# Patient Record
Sex: MALE | Race: WHITE | NOT HISPANIC OR LATINO | Employment: OTHER | URBAN - METROPOLITAN AREA
[De-identification: names, ages, dates, MRNs, and addresses within clinical notes are randomized per-mention and may not be internally consistent; named-entity substitution may affect disease eponyms.]

---

## 2023-02-14 PROBLEM — K63.5 COLONIC POLYP: Status: ACTIVE | Noted: 2023-02-14

## 2023-02-14 PROBLEM — K21.9 GERD (GASTROESOPHAGEAL REFLUX DISEASE): Status: ACTIVE | Noted: 2023-02-14

## 2023-02-14 PROBLEM — Z86.0100 HISTORY OF COLON POLYPS: Status: ACTIVE | Noted: 2023-02-14

## 2023-02-14 PROBLEM — I10 ESSENTIAL HYPERTENSION: Status: ACTIVE | Noted: 2023-02-14

## 2023-02-14 PROBLEM — K76.0 FATTY INFILTRATION OF LIVER: Status: ACTIVE | Noted: 2023-02-14

## 2023-02-14 PROBLEM — R73.01 ABNORMAL FASTING GLUCOSE: Status: ACTIVE | Noted: 2023-02-14

## 2023-02-14 PROBLEM — R40.0 DAYTIME SLEEPINESS: Status: ACTIVE | Noted: 2023-02-14

## 2023-02-14 PROBLEM — Z86.010 HISTORY OF COLON POLYPS: Status: ACTIVE | Noted: 2023-02-14

## 2023-02-14 PROBLEM — I50.32 CHRONIC DIASTOLIC CONGESTIVE HEART FAILURE (MULTI): Status: ACTIVE | Noted: 2023-02-14

## 2023-02-14 PROBLEM — E78.5 HYPERLIPIDEMIA: Status: ACTIVE | Noted: 2023-02-14

## 2023-02-14 PROBLEM — J01.00 ACUTE MAXILLARY SINUSITIS: Status: ACTIVE | Noted: 2023-02-14

## 2023-02-14 PROBLEM — R29.818 SUSPECTED SLEEP APNEA: Status: ACTIVE | Noted: 2023-02-14

## 2023-02-14 PROBLEM — R79.89 ELEVATED LFTS: Status: ACTIVE | Noted: 2023-02-14

## 2023-02-14 PROBLEM — D69.6 THROMBOCYTOPENIA (CMS-HCC): Status: ACTIVE | Noted: 2023-02-14

## 2023-02-14 RX ORDER — GINSENG 100 MG
CAPSULE ORAL
COMMUNITY

## 2023-02-14 RX ORDER — VALSARTAN AND HYDROCHLOROTHIAZIDE 320; 12.5 MG/1; MG/1
1 TABLET, FILM COATED ORAL DAILY
COMMUNITY
Start: 2019-05-17 | End: 2023-03-27 | Stop reason: SDUPTHER

## 2023-02-14 RX ORDER — MULTIVIT-MIN/IRON/FOLIC ACID/K 18-600-40
CAPSULE ORAL
COMMUNITY

## 2023-03-22 LAB
ALANINE AMINOTRANSFERASE (SGPT) (U/L) IN SER/PLAS: 48 U/L (ref 10–52)
ALBUMIN (G/DL) IN SER/PLAS: 4.1 G/DL (ref 3.4–5)
ALKALINE PHOSPHATASE (U/L) IN SER/PLAS: 94 U/L (ref 33–136)
ANION GAP IN SER/PLAS: 12 MMOL/L (ref 10–20)
ASPARTATE AMINOTRANSFERASE (SGOT) (U/L) IN SER/PLAS: 76 U/L (ref 9–39)
BILIRUBIN TOTAL (MG/DL) IN SER/PLAS: 1.4 MG/DL (ref 0–1.2)
CALCIUM (MG/DL) IN SER/PLAS: 9.3 MG/DL (ref 8.6–10.3)
CARBON DIOXIDE, TOTAL (MMOL/L) IN SER/PLAS: 29 MMOL/L (ref 21–32)
CHLORIDE (MMOL/L) IN SER/PLAS: 99 MMOL/L (ref 98–107)
CHOLESTEROL (MG/DL) IN SER/PLAS: 266 MG/DL (ref 0–199)
CHOLESTEROL IN HDL (MG/DL) IN SER/PLAS: 49 MG/DL
CHOLESTEROL/HDL RATIO: 5.4
CREATININE (MG/DL) IN SER/PLAS: 0.87 MG/DL (ref 0.5–1.3)
GFR MALE: >90 ML/MIN/1.73M2
GLUCOSE (MG/DL) IN SER/PLAS: 121 MG/DL (ref 74–99)
LDL: 167 MG/DL (ref 0–99)
NON HDL CHOLESTEROL: 217 MG/DL
POTASSIUM (MMOL/L) IN SER/PLAS: 4 MMOL/L (ref 3.5–5.3)
PROTEIN TOTAL: 7.8 G/DL (ref 6.4–8.2)
SODIUM (MMOL/L) IN SER/PLAS: 136 MMOL/L (ref 136–145)
TRIGLYCERIDE (MG/DL) IN SER/PLAS: 251 MG/DL (ref 0–149)
UREA NITROGEN (MG/DL) IN SER/PLAS: 13 MG/DL (ref 6–23)
VLDL: 50 MG/DL (ref 0–40)

## 2023-03-27 ENCOUNTER — OFFICE VISIT (OUTPATIENT)
Dept: PRIMARY CARE | Facility: CLINIC | Age: 63
End: 2023-03-27
Payer: COMMERCIAL

## 2023-03-27 VITALS
WEIGHT: 239 LBS | BODY MASS INDEX: 32.37 KG/M2 | HEIGHT: 72 IN | HEART RATE: 79 BPM | SYSTOLIC BLOOD PRESSURE: 140 MMHG | DIASTOLIC BLOOD PRESSURE: 82 MMHG | OXYGEN SATURATION: 98 % | RESPIRATION RATE: 16 BRPM

## 2023-03-27 DIAGNOSIS — I50.32 CHRONIC DIASTOLIC CONGESTIVE HEART FAILURE (MULTI): ICD-10-CM

## 2023-03-27 DIAGNOSIS — R79.89 ELEVATED LFTS: ICD-10-CM

## 2023-03-27 DIAGNOSIS — D69.6 LOW PLATELET COUNT (CMS-HCC): ICD-10-CM

## 2023-03-27 DIAGNOSIS — Z78.9 STATIN INTOLERANCE: ICD-10-CM

## 2023-03-27 DIAGNOSIS — K76.0 FATTY INFILTRATION OF LIVER: ICD-10-CM

## 2023-03-27 DIAGNOSIS — I10 ESSENTIAL HYPERTENSION: Primary | Chronic | ICD-10-CM

## 2023-03-27 PROCEDURE — 3077F SYST BP >= 140 MM HG: CPT | Performed by: STUDENT IN AN ORGANIZED HEALTH CARE EDUCATION/TRAINING PROGRAM

## 2023-03-27 PROCEDURE — G0446 INTENS BEHAVE THER CARDIO DX: HCPCS | Performed by: STUDENT IN AN ORGANIZED HEALTH CARE EDUCATION/TRAINING PROGRAM

## 2023-03-27 PROCEDURE — 1036F TOBACCO NON-USER: CPT | Performed by: STUDENT IN AN ORGANIZED HEALTH CARE EDUCATION/TRAINING PROGRAM

## 2023-03-27 PROCEDURE — 99214 OFFICE O/P EST MOD 30 MIN: CPT | Performed by: STUDENT IN AN ORGANIZED HEALTH CARE EDUCATION/TRAINING PROGRAM

## 2023-03-27 PROCEDURE — 3079F DIAST BP 80-89 MM HG: CPT | Performed by: STUDENT IN AN ORGANIZED HEALTH CARE EDUCATION/TRAINING PROGRAM

## 2023-03-27 RX ORDER — VALSARTAN AND HYDROCHLOROTHIAZIDE 320; 12.5 MG/1; MG/1
1 TABLET, FILM COATED ORAL DAILY
Qty: 90 TABLET | Refills: 0 | Status: SHIPPED | OUTPATIENT
Start: 2023-03-27 | End: 2023-04-24 | Stop reason: SDUPTHER

## 2023-03-27 ASSESSMENT — PATIENT HEALTH QUESTIONNAIRE - PHQ9
SUM OF ALL RESPONSES TO PHQ9 QUESTIONS 1 AND 2: 0
1. LITTLE INTEREST OR PLEASURE IN DOING THINGS: NOT AT ALL
2. FEELING DOWN, DEPRESSED OR HOPELESS: NOT AT ALL

## 2023-03-27 ASSESSMENT — ENCOUNTER SYMPTOMS
BRUISES/BLEEDS EASILY: 0
BLOOD IN STOOL: 0
UNEXPECTED WEIGHT CHANGE: 1
PALPITATIONS: 0
COUGH: 0
ACTIVITY CHANGE: 0
HEMATURIA: 0
ADENOPATHY: 0
OCCASIONAL FEELINGS OF UNSTEADINESS: 0
ABDOMINAL PAIN: 0
FATIGUE: 0
DECREASED CONCENTRATION: 0
CONFUSION: 0
SHORTNESS OF BREATH: 0
LOSS OF SENSATION IN FEET: 0
DEPRESSION: 0

## 2023-03-27 ASSESSMENT — COLUMBIA-SUICIDE SEVERITY RATING SCALE - C-SSRS
6. HAVE YOU EVER DONE ANYTHING, STARTED TO DO ANYTHING, OR PREPARED TO DO ANYTHING TO END YOUR LIFE?: NO
1. IN THE PAST MONTH, HAVE YOU WISHED YOU WERE DEAD OR WISHED YOU COULD GO TO SLEEP AND NOT WAKE UP?: NO
2. HAVE YOU ACTUALLY HAD ANY THOUGHTS OF KILLING YOURSELF?: NO

## 2023-03-27 NOTE — PROGRESS NOTES
Patient Name:  Dalton Valverde  MRN:  [id number]  :  1960    Subjective   Patient ID: Dalton Valverde is a 62 y.o. male who presents for Transfer Of Care.    HPI     61 yo male here for lab review   He denies any concerns today    Review of Systems   Constitutional:  Positive for unexpected weight change. Negative for activity change and fatigue.   Eyes:  Negative for visual disturbance.   Respiratory:  Negative for cough and shortness of breath.    Cardiovascular:  Negative for chest pain, palpitations and leg swelling.   Gastrointestinal:  Negative for abdominal pain and blood in stool.   Genitourinary:  Negative for hematuria.   Allergic/Immunologic: Negative for immunocompromised state.   Hematological:  Negative for adenopathy. Does not bruise/bleed easily.   Psychiatric/Behavioral:  Negative for confusion and decreased concentration.        Objective   /82   Pulse 79   Resp 16   Ht 1.829 m (6')   Wt 108 kg (239 lb)   SpO2 98%   BMI 32.41 kg/m²     Physical Exam  Constitutional:       General: He is not in acute distress.     Appearance: Normal appearance. He is not ill-appearing.   HENT:      Head: Normocephalic and atraumatic.      Right Ear: Hearing normal.      Left Ear: Hearing normal.      Mouth/Throat:      Pharynx: No oropharyngeal exudate or posterior oropharyngeal erythema.   Eyes:      Extraocular Movements: Extraocular movements intact.   Cardiovascular:      Rate and Rhythm: Normal rate and regular rhythm.   Pulmonary:      Effort: Pulmonary effort is normal. No respiratory distress.   Abdominal:      Tenderness: There is no abdominal tenderness.   Musculoskeletal:      Cervical back: No tenderness.      Right lower leg: No edema.      Left lower leg: No edema.   Skin:     General: Skin is warm and dry.   Neurological:      General: No focal deficit present.      Mental Status: He is alert and oriented to person, place, and time.   Psychiatric:         Mood and Affect: Mood normal.          Behavior: Behavior normal.       Assessment/Plan   Problem List Items Addressed This Visit          Circulatory    Chronic diastolic congestive heart failure (CMS/HCC) (Chronic)     Echo 2017-normal LV size and function with an ejection fraction of 65%, stage II diastolic dysfunction, mild left ventricular hypertrophy, normal RV size and function, and no significant valvular abnormalities.   Not fluid overloaded- not requiring lasix  BP managed with valsartan-hydrochlorothiazide 320-12.5   No BB          Relevant Orders    Follow Up In [facility] - PCP    Essential hypertension - Primary (Chronic)     Goal >130/80, not at goal today, patient on edge after platelet discussion, will hold on med change, if next visit elevation will adjust regimen  Continue valsartan-hydrochlorothiazide 320-12.5  Complete BMP  Work on consistent activity and monitoring sodium intake          Relevant Medications    valsartan-hydrochlorothiazide (Diovan-HCT) 320-12.5 mg tablet    Other Relevant Orders    Follow Up In [facility] - PCP       Digestive    Elevated LFTs    Relevant Orders    Follow Up In [facility] - PCP    Fatty infiltration of liver     No US on file, LFTs improved from prior reading, continue to monitor, low threshold for imaging  Patient issues with statins, does have hx of poor controlled cholesterol  Does regularly drink beer, counseled to work on cutting back/stopping         Relevant Orders    Follow Up In [facility] - PCP       Hematologic    Low platelet count (CMS/HCC)     Platelets have been downtrending   2018-183  2021-130  2022-127  2022-110  Recheck today, referral to hematology placed   Does have hx of fatty liver, and abnormal LFTs         Relevant Orders    Referral to Hematology    CBC and Auto Differential    Follow Up In [facility] - PCP       Other    Statin intolerance     ASCVD risk score calculated at: 19.7  Hx of MI or stroke: No  Age:  62  Discussion of aspirin--> not indicated, low platelet counts  On statin: No, patient stating intolerance   Patient does not wish to start on statin therapy at this time, aware of risk score  Counseling on lifestyle modifications including activity level and diet chocies  Counseling on risk mitigation         Relevant Orders    Follow Up In [facility] - PCP

## 2023-03-27 NOTE — ASSESSMENT & PLAN NOTE
No US on file, LFTs improved from prior reading, continue to monitor, low threshold for imaging  Patient issues with statins, does have hx of poor controlled cholesterol  Does regularly drink beer, counseled to work on cutting back/stopping

## 2023-03-27 NOTE — ASSESSMENT & PLAN NOTE
Platelets have been downtrending   2018-183  2021-130  2022-127  2022-110  Recheck today, referral to hematology placed   Does have hx of fatty liver, and abnormal LFTs

## 2023-03-27 NOTE — ASSESSMENT & PLAN NOTE
Echo 2017-normal LV size and function with an ejection fraction of 65%, stage II diastolic dysfunction, mild left ventricular hypertrophy, normal RV size and function, and no significant valvular abnormalities.   Not fluid overloaded- not requiring lasix  BP managed with valsartan-hydrochlorothiazide 320-12.5   No BB

## 2023-03-27 NOTE — ASSESSMENT & PLAN NOTE
Goal >130/80, not at goal today, patient on edge after platelet discussion, will hold on med change, if next visit elevation will adjust regimen  Continue valsartan-hydrochlorothiazide 320-12.5  Complete BMP  Work on consistent activity and monitoring sodium intake

## 2023-03-27 NOTE — ASSESSMENT & PLAN NOTE
ASCVD risk score calculated at: 19.7  Hx of MI or stroke: No  Age: 62  Discussion of aspirin--> not indicated, low platelet counts  On statin: No, patient stating intolerance   Patient does not wish to start on statin therapy at this time, aware of risk score  Counseling on lifestyle modifications including activity level and diet chocies  Counseling on risk mitigation

## 2023-04-24 DIAGNOSIS — I10 ESSENTIAL HYPERTENSION: Chronic | ICD-10-CM

## 2023-04-25 RX ORDER — VALSARTAN AND HYDROCHLOROTHIAZIDE 320; 12.5 MG/1; MG/1
1 TABLET, FILM COATED ORAL DAILY
Qty: 90 TABLET | Refills: 0 | Status: SHIPPED | OUTPATIENT
Start: 2023-04-25 | End: 2023-05-02 | Stop reason: SDUPTHER

## 2023-05-02 ENCOUNTER — TELEPHONE (OUTPATIENT)
Dept: PRIMARY CARE | Facility: CLINIC | Age: 63
End: 2023-05-02

## 2023-05-02 DIAGNOSIS — I10 ESSENTIAL HYPERTENSION: Chronic | ICD-10-CM

## 2023-05-02 NOTE — TELEPHONE ENCOUNTER
Needs  a refill on Valsartan/HTCZ 320/5 sent to [facility] for long term and a sort term sent to [facility]

## 2023-05-03 RX ORDER — VALSARTAN AND HYDROCHLOROTHIAZIDE 320; 12.5 MG/1; MG/1
1 TABLET, FILM COATED ORAL DAILY
Qty: 90 TABLET | Refills: 0 | Status: SHIPPED | OUTPATIENT
Start: 2023-05-03 | End: 2023-08-18 | Stop reason: SDUPTHER

## 2023-08-18 DIAGNOSIS — I10 ESSENTIAL HYPERTENSION: Chronic | ICD-10-CM

## 2023-08-21 RX ORDER — VALSARTAN AND HYDROCHLOROTHIAZIDE 320; 12.5 MG/1; MG/1
1 TABLET, FILM COATED ORAL DAILY
Qty: 90 TABLET | Refills: 0 | Status: SHIPPED | OUTPATIENT
Start: 2023-08-21 | End: 2023-08-24 | Stop reason: WASHOUT

## 2023-08-24 ENCOUNTER — OFFICE VISIT (OUTPATIENT)
Dept: PRIMARY CARE | Facility: CLINIC | Age: 63
End: 2023-08-24
Payer: MEDICARE

## 2023-08-24 VITALS
SYSTOLIC BLOOD PRESSURE: 146 MMHG | RESPIRATION RATE: 16 BRPM | DIASTOLIC BLOOD PRESSURE: 76 MMHG | WEIGHT: 232 LBS | HEIGHT: 72 IN | OXYGEN SATURATION: 94 % | HEART RATE: 88 BPM | BODY MASS INDEX: 31.42 KG/M2

## 2023-08-24 DIAGNOSIS — I10 ESSENTIAL HYPERTENSION: Primary | Chronic | ICD-10-CM

## 2023-08-24 DIAGNOSIS — Z78.9 STATIN INTOLERANCE: ICD-10-CM

## 2023-08-24 DIAGNOSIS — E78.49 OTHER HYPERLIPIDEMIA: ICD-10-CM

## 2023-08-24 DIAGNOSIS — I50.32 CHRONIC DIASTOLIC CONGESTIVE HEART FAILURE (MULTI): Chronic | ICD-10-CM

## 2023-08-24 DIAGNOSIS — D69.6 LOW PLATELET COUNT (CMS-HCC): ICD-10-CM

## 2023-08-24 DIAGNOSIS — R79.89 ELEVATED LFTS: ICD-10-CM

## 2023-08-24 PROCEDURE — 1036F TOBACCO NON-USER: CPT | Performed by: STUDENT IN AN ORGANIZED HEALTH CARE EDUCATION/TRAINING PROGRAM

## 2023-08-24 PROCEDURE — 1160F RVW MEDS BY RX/DR IN RCRD: CPT | Performed by: STUDENT IN AN ORGANIZED HEALTH CARE EDUCATION/TRAINING PROGRAM

## 2023-08-24 PROCEDURE — 1159F MED LIST DOCD IN RCRD: CPT | Performed by: STUDENT IN AN ORGANIZED HEALTH CARE EDUCATION/TRAINING PROGRAM

## 2023-08-24 PROCEDURE — 3078F DIAST BP <80 MM HG: CPT | Performed by: STUDENT IN AN ORGANIZED HEALTH CARE EDUCATION/TRAINING PROGRAM

## 2023-08-24 PROCEDURE — 99214 OFFICE O/P EST MOD 30 MIN: CPT | Performed by: STUDENT IN AN ORGANIZED HEALTH CARE EDUCATION/TRAINING PROGRAM

## 2023-08-24 PROCEDURE — 3077F SYST BP >= 140 MM HG: CPT | Performed by: STUDENT IN AN ORGANIZED HEALTH CARE EDUCATION/TRAINING PROGRAM

## 2023-08-24 RX ORDER — VALSARTAN AND HYDROCHLOROTHIAZIDE 320; 25 MG/1; MG/1
1 TABLET, FILM COATED ORAL DAILY
Qty: 30 TABLET | Refills: 0 | Status: SHIPPED | OUTPATIENT
Start: 2023-08-24 | End: 2023-10-16

## 2023-08-24 RX ORDER — VALSARTAN AND HYDROCHLOROTHIAZIDE 320; 25 MG/1; MG/1
1 TABLET, FILM COATED ORAL DAILY
Qty: 90 TABLET | Refills: 0 | Status: SHIPPED | OUTPATIENT
Start: 2023-08-24 | End: 2023-10-16

## 2023-08-24 RX ORDER — VALSARTAN AND HYDROCHLOROTHIAZIDE 320; 12.5 MG/1; MG/1
1 TABLET, FILM COATED ORAL DAILY
Qty: 90 TABLET | Refills: 0 | Status: CANCELLED | OUTPATIENT
Start: 2023-08-24 | End: 2023-11-22

## 2023-08-24 ASSESSMENT — ENCOUNTER SYMPTOMS
HEADACHES: 0
DEPRESSION: 0
OCCASIONAL FEELINGS OF UNSTEADINESS: 0
APPETITE CHANGE: 0
DIZZINESS: 0
PALPITATIONS: 0
SHORTNESS OF BREATH: 0
NERVOUS/ANXIOUS: 0
LOSS OF SENSATION IN FEET: 0
FATIGUE: 0
ACTIVITY CHANGE: 0
CHEST TIGHTNESS: 0

## 2023-08-24 ASSESSMENT — PATIENT HEALTH QUESTIONNAIRE - PHQ9
1. LITTLE INTEREST OR PLEASURE IN DOING THINGS: NOT AT ALL
SUM OF ALL RESPONSES TO PHQ9 QUESTIONS 1 AND 2: 0
2. FEELING DOWN, DEPRESSED OR HOPELESS: NOT AT ALL

## 2023-08-24 NOTE — ASSESSMENT & PLAN NOTE
Elevated pressures again today, increase diovan to 320-25 dose(short term supply to [place])  BMP to follow K  Goal BP >130/80  Work on maintaining a healthy weight, monitoring sodium intake, consistent activity and exercise  Avoid chronic use of NSAIDs  Do not use sudafed for decongestant when ill

## 2023-08-24 NOTE — ASSESSMENT & PLAN NOTE
Intolerant of statins  22.3 ASCVD risk score  May benefit from aspirin therapy  Work on increasing dietary fiber
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
## 2023-08-24 NOTE — ASSESSMENT & PLAN NOTE
Last echo in 2017, due for repeat ordered  No fluid overload not requiring lasix  No BB but on ARB combo for BP management, working on getting WNL

## 2023-10-13 ENCOUNTER — TELEPHONE (OUTPATIENT)
Dept: PRIMARY CARE | Facility: CLINIC | Age: 63
End: 2023-10-13
Payer: COMMERCIAL

## 2023-10-13 DIAGNOSIS — I10 ESSENTIAL HYPERTENSION: Chronic | ICD-10-CM

## 2023-10-13 NOTE — TELEPHONE ENCOUNTER
Refill on Valsartan -hydrochlorothiazide 320 / 25 mg    #90 day supply to [facility]    & #30 day supply to [facility]

## 2023-10-16 RX ORDER — VALSARTAN AND HYDROCHLOROTHIAZIDE 320; 25 MG/1; MG/1
1 TABLET, FILM COATED ORAL DAILY
Qty: 30 TABLET | Refills: 0 | Status: SHIPPED | OUTPATIENT
Start: 2023-10-16 | End: 2023-11-08 | Stop reason: SDUPTHER

## 2023-10-16 RX ORDER — VALSARTAN AND HYDROCHLOROTHIAZIDE 320; 25 MG/1; MG/1
1 TABLET, FILM COATED ORAL DAILY
Qty: 90 TABLET | Refills: 0 | Status: SHIPPED | OUTPATIENT
Start: 2023-10-16 | End: 2023-12-22

## 2023-10-16 NOTE — TELEPHONE ENCOUNTER
I can not remember how to do send the same script to two different pharmacies are you willing to show me again.

## 2023-11-08 ENCOUNTER — OFFICE VISIT (OUTPATIENT)
Dept: PRIMARY CARE | Facility: CLINIC | Age: 63
End: 2023-11-08
Payer: COMMERCIAL

## 2023-11-08 VITALS
HEIGHT: 72 IN | SYSTOLIC BLOOD PRESSURE: 130 MMHG | OXYGEN SATURATION: 97 % | DIASTOLIC BLOOD PRESSURE: 72 MMHG | RESPIRATION RATE: 16 BRPM | BODY MASS INDEX: 30.99 KG/M2 | WEIGHT: 228.8 LBS | HEART RATE: 74 BPM

## 2023-11-08 DIAGNOSIS — Z00.00 MEDICARE ANNUAL WELLNESS VISIT, INITIAL: Primary | ICD-10-CM

## 2023-11-08 DIAGNOSIS — E78.49 OTHER HYPERLIPIDEMIA: ICD-10-CM

## 2023-11-08 DIAGNOSIS — R79.89 ELEVATED LFTS: ICD-10-CM

## 2023-11-08 DIAGNOSIS — I10 ESSENTIAL HYPERTENSION: Chronic | ICD-10-CM

## 2023-11-08 PROBLEM — E78.5 HYPERLIPIDEMIA: Chronic | Status: ACTIVE | Noted: 2023-02-14

## 2023-11-08 PROCEDURE — 1159F MED LIST DOCD IN RCRD: CPT | Performed by: STUDENT IN AN ORGANIZED HEALTH CARE EDUCATION/TRAINING PROGRAM

## 2023-11-08 PROCEDURE — 1170F FXNL STATUS ASSESSED: CPT | Performed by: STUDENT IN AN ORGANIZED HEALTH CARE EDUCATION/TRAINING PROGRAM

## 2023-11-08 PROCEDURE — 99213 OFFICE O/P EST LOW 20 MIN: CPT | Performed by: STUDENT IN AN ORGANIZED HEALTH CARE EDUCATION/TRAINING PROGRAM

## 2023-11-08 PROCEDURE — 1036F TOBACCO NON-USER: CPT | Performed by: STUDENT IN AN ORGANIZED HEALTH CARE EDUCATION/TRAINING PROGRAM

## 2023-11-08 PROCEDURE — 3078F DIAST BP <80 MM HG: CPT | Performed by: STUDENT IN AN ORGANIZED HEALTH CARE EDUCATION/TRAINING PROGRAM

## 2023-11-08 PROCEDURE — G0402 INITIAL PREVENTIVE EXAM: HCPCS | Performed by: STUDENT IN AN ORGANIZED HEALTH CARE EDUCATION/TRAINING PROGRAM

## 2023-11-08 PROCEDURE — 3075F SYST BP GE 130 - 139MM HG: CPT | Performed by: STUDENT IN AN ORGANIZED HEALTH CARE EDUCATION/TRAINING PROGRAM

## 2023-11-08 PROCEDURE — 93000 ELECTROCARDIOGRAM COMPLETE: CPT | Performed by: STUDENT IN AN ORGANIZED HEALTH CARE EDUCATION/TRAINING PROGRAM

## 2023-11-08 PROCEDURE — 1160F RVW MEDS BY RX/DR IN RCRD: CPT | Performed by: STUDENT IN AN ORGANIZED HEALTH CARE EDUCATION/TRAINING PROGRAM

## 2023-11-08 ASSESSMENT — ENCOUNTER SYMPTOMS
DEPRESSION: 0
FEVER: 0
DIZZINESS: 0
SPEECH DIFFICULTY: 0
WOUND: 1
COLOR CHANGE: 0
OCCASIONAL FEELINGS OF UNSTEADINESS: 0
COUGH: 0
SHORTNESS OF BREATH: 0
PALPITATIONS: 0
ACTIVITY CHANGE: 0
VOMITING: 0
LOSS OF SENSATION IN FEET: 0
NAUSEA: 0
CONFUSION: 0
FATIGUE: 0
NERVOUS/ANXIOUS: 0
DYSPHORIC MOOD: 0
ARTHRALGIAS: 0
DIARRHEA: 0

## 2023-11-08 ASSESSMENT — ACTIVITIES OF DAILY LIVING (ADL)
TAKING_MEDICATION: INDEPENDENT
MANAGING_FINANCES: INDEPENDENT
DRESSING: INDEPENDENT
DOING_HOUSEWORK: INDEPENDENT
GROCERY_SHOPPING: INDEPENDENT
BATHING: INDEPENDENT

## 2023-11-08 ASSESSMENT — PATIENT HEALTH QUESTIONNAIRE - PHQ9
SUM OF ALL RESPONSES TO PHQ9 QUESTIONS 1 AND 2: 0
2. FEELING DOWN, DEPRESSED OR HOPELESS: NOT AT ALL
1. LITTLE INTEREST OR PLEASURE IN DOING THINGS: NOT AT ALL

## 2023-11-08 NOTE — ASSESSMENT & PLAN NOTE
"EKG performed, read attached WNL  PNEUMONIA vaccine- Declined  SHINGLES vaccine- Declined  INFLUENZA vaccine- Declined  Screening tests:  Colon cancer screening--> Due, patient declines \"until after Feb 2024\"  Prostate Cancer Screening--> Not Indicated  During the course of the visit the patient was educated and counseled about age appropriate screening and preventive services. Completed preventive screenings were documented in the chart and orders were placed for outstanding screenings/procedures as documented in the Assessment and Plan.  Patient Instructions (the written plan) was given to the patient at check out.    "

## 2023-11-08 NOTE — ASSESSMENT & PLAN NOTE
ASCVD risk score calculated at: 18.5  Hx of MI or stroke: No  Age:  63  Discussion of aspirin--> not indicated at this time  On statin: Intolerant of statin, working on lifestyle modifications, can consider repatha through cardiology as alternative, he would like to wait   Shared decision making  Counseling on lifestyle modifications including activity level and diet chocies  Counseling on risk mitigation

## 2023-11-08 NOTE — PROGRESS NOTES
"Patient Name:  Dalton Valverde  MRN:  [id number]  :  1960    Subjective   Patient ID: Dalton Valverde is a 63 y.o. male who presents for Medicare Annual Wellness Visit Initial.    HPI     62 yo male here for medicare initial as well as     LV increased diovan 320-25  BP better controlled today     \"Repeat colonoscopy in 6 months for surveillance after piecemeal polypectomy.\"    Was using a chain saw and knocked his leg into a pile of logs, lots of scrapes on legs     Review of Systems   Constitutional:  Negative for activity change, fatigue and fever.   Eyes:  Negative for visual disturbance.   Respiratory:  Negative for cough and shortness of breath.    Cardiovascular:  Negative for chest pain, palpitations and leg swelling.   Gastrointestinal:  Negative for diarrhea, nausea and vomiting.   Musculoskeletal:  Negative for arthralgias and gait problem.   Skin:  Positive for wound. Negative for color change.   Allergic/Immunologic: Negative for immunocompromised state.   Neurological:  Negative for dizziness and speech difficulty.   Psychiatric/Behavioral:  Negative for confusion and dysphoric mood. The patient is not nervous/anxious.      Objective   /72   Pulse 74   Resp 16   Ht 1.829 m (6')   Wt 104 kg (228 lb 12.8 oz)   SpO2 97%   BMI 31.03 kg/m²     Physical Exam  Constitutional:       General: He is not in acute distress.     Appearance: Normal appearance. He is not ill-appearing.   HENT:      Head: Normocephalic and atraumatic.      Right Ear: Hearing normal.      Left Ear: Hearing normal.      Mouth/Throat:      Pharynx: No oropharyngeal exudate or posterior oropharyngeal erythema.   Eyes:      Extraocular Movements: Extraocular movements intact.   Cardiovascular:      Rate and Rhythm: Normal rate and regular rhythm.   Pulmonary:      Effort: Pulmonary effort is normal. No respiratory distress.   Abdominal:      Tenderness: There is no abdominal tenderness.   Musculoskeletal:      Cervical " "back: No tenderness.      Right lower leg: No edema.      Left lower leg: No edema.   Skin:     General: Skin is warm and dry.             Comments: Abrasions along the legs from fall, no hematoma, no fluctuance, no warmth    Neurological:      General: No focal deficit present.      Mental Status: He is alert and oriented to person, place, and time.   Psychiatric:         Mood and Affect: Mood normal.         Behavior: Behavior normal.     Assessment/Plan   Problem List Items Addressed This Visit             ICD-10-CM    Elevated LFTs R79.89    Relevant Orders    Comprehensive metabolic panel    Essential hypertension (Chronic) I10     Goal BP >130/80  Continue Diovan   Work on maintaining a healthy weight, monitoring sodium intake, consistent activity and exercise  Avoid chronic use of NSAIDs  Do not use sudafed for decongestant when ill            Relevant Orders    Follow Up In [facility] - PCP - Established    Hyperlipidemia (Chronic) E78.5     ASCVD risk score calculated at: 18.5  Hx of MI or stroke: No  Age:  63  Discussion of aspirin--> not indicated at this time  On statin: Intolerant of statin, working on lifestyle modifications, can consider repatha through cardiology as alternative, he would like to wait   Shared decision making  Counseling on lifestyle modifications including activity level and diet chocies  Counseling on risk mitigation           Relevant Orders    Lipid Panel    Medicare annual wellness visit, initial - Primary Z00.00     EKG performed, read attached WNL  PNEUMONIA vaccine- Declined  SHINGLES vaccine- Declined  INFLUENZA vaccine- Declined  Screening tests:  Colon cancer screening--> Due, patient declines \"until after Feb 2024\"  Prostate Cancer Screening--> Not Indicated  During the course of the visit the patient was educated and counseled about age appropriate screening and preventive services. Completed preventive screenings were documented in the chart and orders were " placed for outstanding screenings/procedures as documented in the Assessment and Plan.  Patient Instructions (the written plan) was given to the patient at check out.           Relevant Orders    ECG 12 lead (Clinic Performed) (Completed)

## 2023-11-08 NOTE — ASSESSMENT & PLAN NOTE
Goal BP >130/80  Continue Ayde   Work on maintaining a healthy weight, monitoring sodium intake, consistent activity and exercise  Avoid chronic use of NSAIDs  Do not use sudafed for decongestant when ill

## 2023-12-22 DIAGNOSIS — I10 ESSENTIAL HYPERTENSION: Chronic | ICD-10-CM

## 2023-12-22 RX ORDER — VALSARTAN AND HYDROCHLOROTHIAZIDE 320; 25 MG/1; MG/1
1 TABLET, FILM COATED ORAL DAILY
Qty: 100 TABLET | Refills: 1 | Status: SHIPPED | OUTPATIENT
Start: 2023-12-22 | End: 2024-05-09 | Stop reason: SDUPTHER

## 2024-05-08 ENCOUNTER — APPOINTMENT (OUTPATIENT)
Dept: PRIMARY CARE | Facility: CLINIC | Age: 64
End: 2024-05-08
Payer: COMMERCIAL

## 2024-05-09 ENCOUNTER — OFFICE VISIT (OUTPATIENT)
Dept: PRIMARY CARE | Facility: CLINIC | Age: 64
End: 2024-05-09
Payer: COMMERCIAL

## 2024-05-09 VITALS
WEIGHT: 240 LBS | DIASTOLIC BLOOD PRESSURE: 76 MMHG | HEART RATE: 75 BPM | BODY MASS INDEX: 32.51 KG/M2 | HEIGHT: 72 IN | SYSTOLIC BLOOD PRESSURE: 134 MMHG | OXYGEN SATURATION: 97 %

## 2024-05-09 DIAGNOSIS — R79.89 ELEVATED LFTS: ICD-10-CM

## 2024-05-09 DIAGNOSIS — I50.32 CHRONIC DIASTOLIC CONGESTIVE HEART FAILURE (MULTI): Chronic | ICD-10-CM

## 2024-05-09 DIAGNOSIS — R33.9 INCOMPLETE BLADDER EMPTYING: Primary | ICD-10-CM

## 2024-05-09 DIAGNOSIS — D69.6 LOW PLATELET COUNT (CMS-HCC): ICD-10-CM

## 2024-05-09 DIAGNOSIS — E78.49 OTHER HYPERLIPIDEMIA: ICD-10-CM

## 2024-05-09 DIAGNOSIS — I10 ESSENTIAL HYPERTENSION: Chronic | ICD-10-CM

## 2024-05-09 DIAGNOSIS — R79.9 ABNORMAL FINDING OF BLOOD CHEMISTRY, UNSPECIFIED: ICD-10-CM

## 2024-05-09 PROCEDURE — 1036F TOBACCO NON-USER: CPT | Performed by: STUDENT IN AN ORGANIZED HEALTH CARE EDUCATION/TRAINING PROGRAM

## 2024-05-09 PROCEDURE — 99214 OFFICE O/P EST MOD 30 MIN: CPT | Performed by: STUDENT IN AN ORGANIZED HEALTH CARE EDUCATION/TRAINING PROGRAM

## 2024-05-09 PROCEDURE — 1160F RVW MEDS BY RX/DR IN RCRD: CPT | Performed by: STUDENT IN AN ORGANIZED HEALTH CARE EDUCATION/TRAINING PROGRAM

## 2024-05-09 PROCEDURE — 3078F DIAST BP <80 MM HG: CPT | Performed by: STUDENT IN AN ORGANIZED HEALTH CARE EDUCATION/TRAINING PROGRAM

## 2024-05-09 PROCEDURE — 3075F SYST BP GE 130 - 139MM HG: CPT | Performed by: STUDENT IN AN ORGANIZED HEALTH CARE EDUCATION/TRAINING PROGRAM

## 2024-05-09 PROCEDURE — 1159F MED LIST DOCD IN RCRD: CPT | Performed by: STUDENT IN AN ORGANIZED HEALTH CARE EDUCATION/TRAINING PROGRAM

## 2024-05-09 RX ORDER — VALSARTAN AND HYDROCHLOROTHIAZIDE 320; 25 MG/1; MG/1
1 TABLET, FILM COATED ORAL DAILY
Qty: 90 TABLET | Refills: 3 | Status: SHIPPED | OUTPATIENT
Start: 2024-05-09 | End: 2025-05-09

## 2024-05-09 ASSESSMENT — ENCOUNTER SYMPTOMS
FEVER: 0
COUGH: 0
CONSTIPATION: 0
DECREASED CONCENTRATION: 0
LOSS OF SENSATION IN FEET: 0
HEADACHES: 0
SHORTNESS OF BREATH: 0
OCCASIONAL FEELINGS OF UNSTEADINESS: 0
ARTHRALGIAS: 1
FATIGUE: 0
PALPITATIONS: 0
DIFFICULTY URINATING: 1
DEPRESSION: 0
BACK PAIN: 0
FREQUENCY: 0
DYSURIA: 0
FLANK PAIN: 0
UNEXPECTED WEIGHT CHANGE: 0
CONFUSION: 0
FACIAL ASYMMETRY: 0

## 2024-05-09 ASSESSMENT — COLUMBIA-SUICIDE SEVERITY RATING SCALE - C-SSRS
1. IN THE PAST MONTH, HAVE YOU WISHED YOU WERE DEAD OR WISHED YOU COULD GO TO SLEEP AND NOT WAKE UP?: NO
2. HAVE YOU ACTUALLY HAD ANY THOUGHTS OF KILLING YOURSELF?: NO
6. HAVE YOU EVER DONE ANYTHING, STARTED TO DO ANYTHING, OR PREPARED TO DO ANYTHING TO END YOUR LIFE?: NO

## 2024-05-09 NOTE — ASSESSMENT & PLAN NOTE
Echo last- 2017 65%   Updating ordered    ACE/ARB:Yes, describe: valsartan   Arni-No  SGLT2-No  Diuretic- No  BB-No  MRA-No  Please limit your fluid intake to less than 2 L/day and your salt intake to less than 2 g/day.  Maintain daily weights and if greater than 3 to 5 pound weight gain in a 24 to 48-hour. Please call the office.

## 2024-05-09 NOTE — PATIENT INSTRUCTIONS
Avoidance of nighttime fluids 1-2 hours before bed  Avoidance of mild diuretics like caffiene and alcohol   Avoidance of double voiding to empty bladder more completely

## 2024-05-09 NOTE — ASSESSMENT & PLAN NOTE
Goal BP less than 130/80  Continue  Work on maintaining a healthy weight, monitoring sodium intake, consistent activity and exercise  Avoid chronic use of NSAIDs  Do not use sudafed for decongestant when ill

## 2024-05-09 NOTE — PROGRESS NOTES
Patient Name:  Dalton Valverde  MRN:  [id number]  :  1960    Subjective   Patient ID: Dalton Valverde is a 63 y.o. male who presents for Follow-up (Pt here for follow up, says that at night when he uses the restroom, he doesn't have a steady flow like he used to.).    HPI     62 yo male presents for follow up  Labs and echo have not been completed     Has had a lot of home stress and been overwhelmed- sister in law passed, mother diagnosed with dementia    Reports coping well though     New concern of incomplete bladder emptying   Progressive for the last year or so  Does seem to improve with not drinking before bed  Unsure of FH, father was catheterized for some time in his older years, unsure of why     Review of Systems   Constitutional:  Negative for fatigue, fever and unexpected weight change.   Respiratory:  Negative for cough and shortness of breath.    Cardiovascular:  Negative for chest pain, palpitations and leg swelling.   Gastrointestinal:  Negative for constipation.   Genitourinary:  Positive for difficulty urinating. Negative for dysuria, flank pain and frequency.        Incomplete emptying    Musculoskeletal:  Positive for arthralgias. Negative for back pain.   Allergic/Immunologic: Negative for immunocompromised state.   Neurological:  Negative for facial asymmetry and headaches.   Psychiatric/Behavioral:  Negative for confusion and decreased concentration.      Objective   /76 (BP Location: Left arm, Patient Position: Sitting)   Pulse 75   Ht 1.829 m (6')   Wt 109 kg (240 lb)   SpO2 97%   BMI 32.55 kg/m²     Physical Exam  Constitutional:       Appearance: Normal appearance.   HENT:      Head: Normocephalic and atraumatic.   Cardiovascular:      Rate and Rhythm: Normal rate and regular rhythm.   Pulmonary:      Effort: Pulmonary effort is normal. No respiratory distress.      Breath sounds: No wheezing.   Abdominal:      General: Bowel sounds are normal.      Tenderness: There is no  abdominal tenderness. There is no right CVA tenderness, left CVA tenderness, guarding or rebound.   Musculoskeletal:         General: No swelling.      Right lower leg: No edema.      Left lower leg: No edema.   Neurological:      General: No focal deficit present.      Mental Status: He is alert and oriented to person, place, and time.   Psychiatric:         Mood and Affect: Mood normal.         Behavior: Behavior normal.     Assessment/Plan   Problem List Items Addressed This Visit             ICD-10-CM    Chronic diastolic congestive heart failure (Multi) (Chronic) I50.32     Echo last- 2017 65%   Updating ordered    ACE/ARB:Yes, describe: valsartan   Arni-No  SGLT2-No  Diuretic- No  BB-No  MRA-No  Please limit your fluid intake to less than 2 L/day and your salt intake to less than 2 g/day.  Maintain daily weights and if greater than 3 to 5 pound weight gain in a 24 to 48-hour. Please call the office.           Elevated LFTs R79.89    Relevant Orders    Comprehensive metabolic panel    Essential hypertension (Chronic) I10     Goal BP less than 130/80  Continue  Work on maintaining a healthy weight, monitoring sodium intake, consistent activity and exercise  Avoid chronic use of NSAIDs  Do not use sudafed for decongestant when ill           Relevant Medications    valsartan-hydrochlorothiazide (Diovan-HCT) 320-25 mg tablet    Other Relevant Orders    CBC and Auto Differential    Follow Up In [facility] - PCP - Established    Hyperlipidemia (Chronic) E78.5    Relevant Orders    Lipid Panel    Low platelet count (CMS-HCC) D69.6     Hematology monitoring with serial labs          Incomplete bladder emptying - Primary R33.9    Relevant Orders    PSA    Urinalysis with Reflex Microscopic    Hemoglobin A1c    Follow Up In [facility] - PCP - Established     Other Visit Diagnoses         Codes    Abnormal finding of blood chemistry, unspecified     R79.9    Relevant Orders    Hemoglobin A1c

## 2024-05-15 ENCOUNTER — APPOINTMENT (OUTPATIENT)
Dept: PRIMARY CARE | Facility: CLINIC | Age: 64
End: 2024-05-15
Payer: COMMERCIAL

## 2024-07-11 DIAGNOSIS — I10 ESSENTIAL HYPERTENSION: Chronic | ICD-10-CM

## 2024-07-11 RX ORDER — VALSARTAN AND HYDROCHLOROTHIAZIDE 320; 25 MG/1; MG/1
1 TABLET, FILM COATED ORAL DAILY
Qty: 21 TABLET | Refills: 0 | Status: SHIPPED | OUTPATIENT
Start: 2024-07-11 | End: 2025-07-11

## 2024-07-11 NOTE — TELEPHONE ENCOUNTER
Valsartan / hydrochlorothiazide  320 / 25 mg---he needs prescription sent to local pharmacy [facility] & then needs 3 month supply sent to [facility]    (Rx on 5-9-24 was sent to wrong pharmacy & he is now totally out of medication)

## 2024-11-08 ENCOUNTER — LAB (OUTPATIENT)
Dept: LAB | Facility: LAB | Age: 64
End: 2024-11-08
Payer: COMMERCIAL

## 2024-11-08 DIAGNOSIS — R79.9 ABNORMAL FINDING OF BLOOD CHEMISTRY, UNSPECIFIED: ICD-10-CM

## 2024-11-08 DIAGNOSIS — I10 ESSENTIAL HYPERTENSION: Chronic | ICD-10-CM

## 2024-11-08 DIAGNOSIS — R79.89 ELEVATED LFTS: ICD-10-CM

## 2024-11-08 DIAGNOSIS — R33.9 INCOMPLETE BLADDER EMPTYING: ICD-10-CM

## 2024-11-08 DIAGNOSIS — E78.49 OTHER HYPERLIPIDEMIA: ICD-10-CM

## 2024-11-08 LAB
ALBUMIN SERPL BCP-MCNC: 3.1 G/DL (ref 3.4–5)
ALP SERPL-CCNC: 179 U/L (ref 33–136)
ALT SERPL W P-5'-P-CCNC: 38 U/L (ref 10–52)
ANION GAP SERPL CALC-SCNC: 13 MMOL/L (ref 10–20)
APPEARANCE UR: CLEAR
AST SERPL W P-5'-P-CCNC: 97 U/L (ref 9–39)
BASOPHILS # BLD AUTO: 0.04 X10*3/UL (ref 0–0.1)
BASOPHILS NFR BLD AUTO: 0.4 %
BILIRUB SERPL-MCNC: 1.9 MG/DL (ref 0–1.2)
BILIRUB UR STRIP.AUTO-MCNC: NEGATIVE MG/DL
BUN SERPL-MCNC: 16 MG/DL (ref 6–23)
CALCIUM SERPL-MCNC: 8 MG/DL (ref 8.6–10.3)
CHLORIDE SERPL-SCNC: 99 MMOL/L (ref 98–107)
CHOLEST SERPL-MCNC: 146 MG/DL (ref 0–199)
CHOLESTEROL/HDL RATIO: 9.9
CO2 SERPL-SCNC: 26 MMOL/L (ref 21–32)
COLOR UR: NORMAL
CREAT SERPL-MCNC: 0.84 MG/DL (ref 0.5–1.3)
EGFRCR SERPLBLD CKD-EPI 2021: >90 ML/MIN/1.73M*2
EOSINOPHIL # BLD AUTO: 0.05 X10*3/UL (ref 0–0.7)
EOSINOPHIL NFR BLD AUTO: 0.5 %
ERYTHROCYTE [DISTWIDTH] IN BLOOD BY AUTOMATED COUNT: 16.7 % (ref 11.5–14.5)
GLUCOSE SERPL-MCNC: 108 MG/DL (ref 74–99)
GLUCOSE UR STRIP.AUTO-MCNC: NORMAL MG/DL
HCT VFR BLD AUTO: 33.3 % (ref 41–52)
HDLC SERPL-MCNC: 14.7 MG/DL
HGB BLD-MCNC: 10.4 G/DL (ref 13.5–17.5)
IMM GRANULOCYTES # BLD AUTO: 0.06 X10*3/UL (ref 0–0.7)
IMM GRANULOCYTES NFR BLD AUTO: 0.6 % (ref 0–0.9)
KETONES UR STRIP.AUTO-MCNC: NEGATIVE MG/DL
LDLC SERPL CALC-MCNC: 106 MG/DL
LEUKOCYTE ESTERASE UR QL STRIP.AUTO: NEGATIVE
LYMPHOCYTES # BLD AUTO: 1.55 X10*3/UL (ref 1.2–4.8)
LYMPHOCYTES NFR BLD AUTO: 16.5 %
MCH RBC QN AUTO: 27.4 PG (ref 26–34)
MCHC RBC AUTO-ENTMCNC: 31.2 G/DL (ref 32–36)
MCV RBC AUTO: 88 FL (ref 80–100)
MONOCYTES # BLD AUTO: 1.02 X10*3/UL (ref 0.1–1)
MONOCYTES NFR BLD AUTO: 10.9 %
NEUTROPHILS # BLD AUTO: 6.68 X10*3/UL (ref 1.2–7.7)
NEUTROPHILS NFR BLD AUTO: 71.1 %
NITRITE UR QL STRIP.AUTO: NEGATIVE
NON HDL CHOLESTEROL: 131 MG/DL (ref 0–149)
NRBC BLD-RTO: 0 /100 WBCS (ref 0–0)
PH UR STRIP.AUTO: 5.5 [PH]
PLATELET # BLD AUTO: 260 X10*3/UL (ref 150–450)
POTASSIUM SERPL-SCNC: 4.7 MMOL/L (ref 3.5–5.3)
PROT SERPL-MCNC: 7.4 G/DL (ref 6.4–8.2)
PROT UR STRIP.AUTO-MCNC: NEGATIVE MG/DL
PSA SERPL-MCNC: 0.42 NG/ML
RBC # BLD AUTO: 3.8 X10*6/UL (ref 4.5–5.9)
RBC # UR STRIP.AUTO: NEGATIVE /UL
SODIUM SERPL-SCNC: 133 MMOL/L (ref 136–145)
SP GR UR STRIP.AUTO: 1.01
TRIGL SERPL-MCNC: 127 MG/DL (ref 0–149)
UROBILINOGEN UR STRIP.AUTO-MCNC: NORMAL MG/DL
VLDL: 25 MG/DL (ref 0–40)
WBC # BLD AUTO: 9.4 X10*3/UL (ref 4.4–11.3)

## 2024-11-08 PROCEDURE — 36415 COLL VENOUS BLD VENIPUNCTURE: CPT

## 2024-11-08 PROCEDURE — 84153 ASSAY OF PSA TOTAL: CPT

## 2024-11-08 PROCEDURE — 81003 URINALYSIS AUTO W/O SCOPE: CPT

## 2024-11-08 PROCEDURE — 80061 LIPID PANEL: CPT

## 2024-11-08 PROCEDURE — 80053 COMPREHEN METABOLIC PANEL: CPT

## 2024-11-08 PROCEDURE — 83036 HEMOGLOBIN GLYCOSYLATED A1C: CPT

## 2024-11-08 PROCEDURE — 85025 COMPLETE CBC W/AUTO DIFF WBC: CPT

## 2024-11-09 LAB
EST. AVERAGE GLUCOSE BLD GHB EST-MCNC: 100 MG/DL
HBA1C MFR BLD: 5.1 %

## 2024-11-13 ENCOUNTER — APPOINTMENT (OUTPATIENT)
Dept: PRIMARY CARE | Facility: CLINIC | Age: 64
End: 2024-11-13
Payer: COMMERCIAL

## 2024-11-13 VITALS
OXYGEN SATURATION: 97 % | HEART RATE: 72 BPM | HEIGHT: 72 IN | SYSTOLIC BLOOD PRESSURE: 110 MMHG | BODY MASS INDEX: 29.8 KG/M2 | WEIGHT: 220 LBS | DIASTOLIC BLOOD PRESSURE: 62 MMHG

## 2024-11-13 DIAGNOSIS — I50.32 CHRONIC DIASTOLIC CONGESTIVE HEART FAILURE: Chronic | ICD-10-CM

## 2024-11-13 DIAGNOSIS — I10 ESSENTIAL HYPERTENSION: Chronic | ICD-10-CM

## 2024-11-13 DIAGNOSIS — Z00.00 MEDICARE ANNUAL WELLNESS VISIT, INITIAL: Primary | ICD-10-CM

## 2024-11-13 DIAGNOSIS — R79.89 ELEVATED LFTS: ICD-10-CM

## 2024-11-13 PROCEDURE — 3074F SYST BP LT 130 MM HG: CPT | Performed by: STUDENT IN AN ORGANIZED HEALTH CARE EDUCATION/TRAINING PROGRAM

## 2024-11-13 PROCEDURE — 1160F RVW MEDS BY RX/DR IN RCRD: CPT | Performed by: STUDENT IN AN ORGANIZED HEALTH CARE EDUCATION/TRAINING PROGRAM

## 2024-11-13 PROCEDURE — G0439 PPPS, SUBSEQ VISIT: HCPCS | Performed by: STUDENT IN AN ORGANIZED HEALTH CARE EDUCATION/TRAINING PROGRAM

## 2024-11-13 PROCEDURE — 1159F MED LIST DOCD IN RCRD: CPT | Performed by: STUDENT IN AN ORGANIZED HEALTH CARE EDUCATION/TRAINING PROGRAM

## 2024-11-13 PROCEDURE — 99214 OFFICE O/P EST MOD 30 MIN: CPT | Performed by: STUDENT IN AN ORGANIZED HEALTH CARE EDUCATION/TRAINING PROGRAM

## 2024-11-13 PROCEDURE — 1036F TOBACCO NON-USER: CPT | Performed by: STUDENT IN AN ORGANIZED HEALTH CARE EDUCATION/TRAINING PROGRAM

## 2024-11-13 PROCEDURE — 99397 PER PM REEVAL EST PAT 65+ YR: CPT | Performed by: STUDENT IN AN ORGANIZED HEALTH CARE EDUCATION/TRAINING PROGRAM

## 2024-11-13 PROCEDURE — 3008F BODY MASS INDEX DOCD: CPT | Performed by: STUDENT IN AN ORGANIZED HEALTH CARE EDUCATION/TRAINING PROGRAM

## 2024-11-13 PROCEDURE — 1170F FXNL STATUS ASSESSED: CPT | Performed by: STUDENT IN AN ORGANIZED HEALTH CARE EDUCATION/TRAINING PROGRAM

## 2024-11-13 PROCEDURE — 3078F DIAST BP <80 MM HG: CPT | Performed by: STUDENT IN AN ORGANIZED HEALTH CARE EDUCATION/TRAINING PROGRAM

## 2024-11-13 PROCEDURE — 1123F ACP DISCUSS/DSCN MKR DOCD: CPT | Performed by: STUDENT IN AN ORGANIZED HEALTH CARE EDUCATION/TRAINING PROGRAM

## 2024-11-13 ASSESSMENT — ENCOUNTER SYMPTOMS
FATIGUE: 1
CONFUSION: 0
ACTIVITY CHANGE: 0
FEVER: 0
ARTHRALGIAS: 1
DECREASED CONCENTRATION: 0
OCCASIONAL FEELINGS OF UNSTEADINESS: 0
DYSPHORIC MOOD: 0
APPETITE CHANGE: 0
DEPRESSION: 0
LOSS OF SENSATION IN FEET: 0
PALPITATIONS: 0

## 2024-11-13 ASSESSMENT — ACTIVITIES OF DAILY LIVING (ADL)
DRESSING: INDEPENDENT
TAKING_MEDICATION: INDEPENDENT
BATHING: INDEPENDENT
GROCERY_SHOPPING: INDEPENDENT
MANAGING_FINANCES: INDEPENDENT
DOING_HOUSEWORK: INDEPENDENT

## 2024-11-13 NOTE — ASSESSMENT & PLAN NOTE
PNEUMONIA vaccine- Declines  SHINGLES vaccine- Declines   INFLUENZA vaccine- DEclines    Screening tests:  Colon cancer screening--> He declines, says he will think about when he turns 65  Prostate Cancer Screening--> UTD WNL    During the course of the visit the patient was educated and counseled about age appropriate screening and preventive services. Completed preventive screenings were documented in the chart and orders were placed for outstanding screenings/procedures as documented in the Assessment and Plan.    Patient Instructions (the written plan) was given to the patient at check out that include any community based lifestyle interventions.    Other risk factors and conditions for which interventions are recommended are addressed as the other tagged diagnoses in this encounter.

## 2024-11-13 NOTE — ASSESSMENT & PLAN NOTE
Recommended to cut back on alcohol consumption   Recheck CMP in 3 months  Last [facility] liver 2022 with dr wilson, repeat   Orders:    US abdomen limited liver; Future    Comprehensive metabolic panel; Future    Follow Up In [facility] - PCP - Established; Future

## 2024-11-13 NOTE — ASSESSMENT & PLAN NOTE
Goal BP less than 130/80  Continue valsartan-hydrochlorothiazide 320-12.5   Work on maintaining a healthy weight, monitoring sodium intake, consistent activity and exercise  Avoid chronic use of NSAIDs  Do not use sudafed/pseudoephedrine for decongestant when ill    Orders:    Follow Up In [facility] - PCP - Established

## 2024-11-13 NOTE — PROGRESS NOTES
Subjective   Reason for Visit: Dalton Valverde is an 64 y.o. male here for a Medicare Wellness visit.     Past Medical, Surgical, and Family History reviewed and updated in chart.    Reviewed all medications by prescribing practitioner or clinical pharmacist (such as prescriptions, OTCs, herbal therapies and supplements) and documented in the medical record.    HPI    63 yo male presents for follow up and medicare wellness   Has not completed echo      to 106  TG normalized  Calcium 8.0  AST 67 to 97 with Alk phos 179  Hemoglobin 10.4, hematocrit 33.3    IMPRESSION:  Coarsened and hyperechoic hepatic parenchyma, compatible with  steatosis or hepatocellular disease. Hepatomegaly. Unremarkable  sonographic appearance of the gallbladder.    Patient Care Team:  Kimberly Don DO as PCP - General (Family Medicine)  Rosanne M Casal, APRN-CNP, DNP as Nurse Practitioner (Hematology and Oncology)     Past Medical History:   Diagnosis Date    Acute diastolic (congestive) heart failure 11/29/2017    Acute diastolic heart failure    Hypothyroidism, unspecified 05/16/2019    Adult hypothyroidism     Past Surgical History:   Procedure Laterality Date    BACK SURGERY  08/25/2017    Back Surgery     Family History   Family history unknown: Yes     Body mass index is 29.84 kg/m².    Tobacco/Alcohol/Opioid use, as well as Illicit Drug Use was screened for/reviewed and documented in Social History section and medication list as appropriate    Medications and Supplements  prescribed by me and other practitioners or clinical pharmacist (such as prescriptions, OTC's, herbal therapies and supplements) were reviewed and documented in the medical record.    Tobacco/Alcohol/Opioid use, as well as Illicit Drug Use was screened for/reviewed and documented in Social History section and medication list as appropriate    Review of Systems   Constitutional:  Positive for fatigue. Negative for activity change, appetite change and fever.    HENT:  Positive for ear pain. Negative for ear discharge.    Cardiovascular:  Negative for chest pain, palpitations and leg swelling.   Musculoskeletal:  Positive for arthralgias.   Allergic/Immunologic: Negative for immunocompromised state.   Psychiatric/Behavioral:  Negative for confusion, decreased concentration and dysphoric mood.      Objective   Vitals:  /62 (BP Location: Left arm, Patient Position: Sitting)   Pulse 72   Ht 1.829 m (6')   Wt 99.8 kg (220 lb)   SpO2 97%   BMI 29.84 kg/m²       Physical Exam  Constitutional:       Appearance: Normal appearance.   HENT:      Head: Normocephalic and atraumatic.   Cardiovascular:      Rate and Rhythm: Normal rate and regular rhythm.      Heart sounds: Murmur heard.   Pulmonary:      Effort: No respiratory distress.      Breath sounds: No wheezing or rales.   Abdominal:      General: Bowel sounds are normal.      Tenderness: There is no abdominal tenderness. There is no guarding or rebound.   Musculoskeletal:      Right lower leg: No edema.      Left lower leg: No edema.   Skin:     Coloration: Skin is not jaundiced or pale.   Neurological:      Mental Status: He is alert and oriented to person, place, and time.   Psychiatric:         Mood and Affect: Mood normal.         Behavior: Behavior normal.       Assessment & Plan  Medicare annual wellness visit, initial  PNEUMONIA vaccine- Declines  SHINGLES vaccine- Declines   INFLUENZA vaccine- DEclines    Screening tests:  Colon cancer screening--> He declines, says he will think about when he turns 65  Prostate Cancer Screening--> UTD WNL    During the course of the visit the patient was educated and counseled about age appropriate screening and preventive services. Completed preventive screenings were documented in the chart and orders were placed for outstanding screenings/procedures as documented in the Assessment and Plan.    Patient Instructions (the written plan) was given to the patient at check out  that include any community based lifestyle interventions.    Other risk factors and conditions for which interventions are recommended are addressed as the other tagged diagnoses in this encounter.            Essential hypertension  Goal BP less than 130/80  Continue valsartan-hydrochlorothiazide 320-12.5   Work on maintaining a healthy weight, monitoring sodium intake, consistent activity and exercise  Avoid chronic use of NSAIDs  Do not use sudafed/pseudoephedrine for decongestant when ill    Orders:    Follow Up In [facility] - PCP - Established    Elevated LFTs  Recommended to cut back on alcohol consumption   Recheck CMP in 3 months  Last us liver 2022 with dr wilson, repeat   Orders:    US abdomen limited liver; Future    Comprehensive metabolic panel; Future    Follow Up In [facility] - PCP - Established; Future    Chronic diastolic congestive heart failure  Has not completed echo, strongly encouraged to do so   ACE/ARB:Yes, describe: valsartan   Arni-No  SGLT2-No  Diuretic- No  BB-No  MRA-No

## 2024-11-13 NOTE — ASSESSMENT & PLAN NOTE
Has not completed echo, strongly encouraged to do so   ACE/ARB:Yes, describe: valsartan   Arni-No  SGLT2-No  Diuretic- No  BB-No  MRA-No

## 2024-12-13 ENCOUNTER — HOSPITAL ENCOUNTER (EMERGENCY)
Facility: HOSPITAL | Age: 64
Discharge: HOME | End: 2024-12-13
Attending: EMERGENCY MEDICINE
Payer: COMMERCIAL

## 2024-12-13 ENCOUNTER — APPOINTMENT (OUTPATIENT)
Dept: CARDIOLOGY | Facility: HOSPITAL | Age: 64
End: 2024-12-13
Payer: COMMERCIAL

## 2024-12-13 ENCOUNTER — APPOINTMENT (OUTPATIENT)
Dept: RADIOLOGY | Facility: HOSPITAL | Age: 64
End: 2024-12-13
Payer: COMMERCIAL

## 2024-12-13 VITALS
SYSTOLIC BLOOD PRESSURE: 150 MMHG | WEIGHT: 212 LBS | TEMPERATURE: 97.5 F | OXYGEN SATURATION: 98 % | HEART RATE: 78 BPM | RESPIRATION RATE: 16 BRPM | HEIGHT: 72 IN | BODY MASS INDEX: 28.71 KG/M2 | DIASTOLIC BLOOD PRESSURE: 79 MMHG

## 2024-12-13 DIAGNOSIS — J18.9 PNEUMONIA OF BOTH LOWER LOBES DUE TO INFECTIOUS ORGANISM: ICD-10-CM

## 2024-12-13 DIAGNOSIS — K70.30 ALCOHOLIC CIRRHOSIS, UNSPECIFIED WHETHER ASCITES PRESENT (MULTI): ICD-10-CM

## 2024-12-13 DIAGNOSIS — R53.1 GENERALIZED WEAKNESS: Primary | ICD-10-CM

## 2024-12-13 LAB
ALBUMIN SERPL BCP-MCNC: 2.3 G/DL (ref 3.4–5)
ALP SERPL-CCNC: 208 U/L (ref 33–136)
ALT SERPL W P-5'-P-CCNC: 23 U/L (ref 10–52)
ANION GAP SERPL CALC-SCNC: 11 MMOL/L (ref 10–20)
APPEARANCE UR: CLEAR
AST SERPL W P-5'-P-CCNC: 84 U/L (ref 9–39)
BASOPHILS # BLD AUTO: 0.05 X10*3/UL (ref 0–0.1)
BASOPHILS NFR BLD AUTO: 0.4 %
BILIRUB SERPL-MCNC: 2.4 MG/DL (ref 0–1.2)
BILIRUB UR STRIP.AUTO-MCNC: NEGATIVE MG/DL
BNP SERPL-MCNC: 78 PG/ML (ref 0–99)
BUN SERPL-MCNC: 14 MG/DL (ref 6–23)
CALCIUM SERPL-MCNC: 7.8 MG/DL (ref 8.6–10.3)
CARDIAC TROPONIN I PNL SERPL HS: 7 NG/L (ref 0–20)
CARDIAC TROPONIN I PNL SERPL HS: 7 NG/L (ref 0–20)
CHLORIDE SERPL-SCNC: 104 MMOL/L (ref 98–107)
CO2 SERPL-SCNC: 23 MMOL/L (ref 21–32)
COLOR UR: YELLOW
CREAT SERPL-MCNC: 0.82 MG/DL (ref 0.5–1.3)
EGFRCR SERPLBLD CKD-EPI 2021: >90 ML/MIN/1.73M*2
EOSINOPHIL # BLD AUTO: 0.16 X10*3/UL (ref 0–0.7)
EOSINOPHIL NFR BLD AUTO: 1.4 %
ERYTHROCYTE [DISTWIDTH] IN BLOOD BY AUTOMATED COUNT: 19.9 % (ref 11.5–14.5)
FLUAV RNA RESP QL NAA+PROBE: NOT DETECTED
FLUBV RNA RESP QL NAA+PROBE: NOT DETECTED
GLUCOSE SERPL-MCNC: 99 MG/DL (ref 74–99)
GLUCOSE UR STRIP.AUTO-MCNC: NORMAL MG/DL
HCT VFR BLD AUTO: 28.5 % (ref 41–52)
HGB BLD-MCNC: 9.3 G/DL (ref 13.5–17.5)
IMM GRANULOCYTES # BLD AUTO: 0.08 X10*3/UL (ref 0–0.7)
IMM GRANULOCYTES NFR BLD AUTO: 0.7 % (ref 0–0.9)
KETONES UR STRIP.AUTO-MCNC: NEGATIVE MG/DL
LACTATE SERPL-SCNC: 2 MMOL/L (ref 0.4–2)
LACTATE SERPL-SCNC: 2.1 MMOL/L (ref 0.4–2)
LEUKOCYTE ESTERASE UR QL STRIP.AUTO: NEGATIVE
LYMPHOCYTES # BLD AUTO: 1.86 X10*3/UL (ref 1.2–4.8)
LYMPHOCYTES NFR BLD AUTO: 15.7 %
MAGNESIUM SERPL-MCNC: 1.87 MG/DL (ref 1.6–2.4)
MCH RBC QN AUTO: 29.4 PG (ref 26–34)
MCHC RBC AUTO-ENTMCNC: 32.6 G/DL (ref 32–36)
MCV RBC AUTO: 90 FL (ref 80–100)
MONOCYTES # BLD AUTO: 1.11 X10*3/UL (ref 0.1–1)
MONOCYTES NFR BLD AUTO: 9.4 %
NEUTROPHILS # BLD AUTO: 8.58 X10*3/UL (ref 1.2–7.7)
NEUTROPHILS NFR BLD AUTO: 72.4 %
NITRITE UR QL STRIP.AUTO: NEGATIVE
NRBC BLD-RTO: 0 /100 WBCS (ref 0–0)
PH UR STRIP.AUTO: 6 [PH]
PHOSPHATE SERPL-MCNC: 3 MG/DL (ref 2.5–4.9)
PLATELET # BLD AUTO: 225 X10*3/UL (ref 150–450)
POTASSIUM SERPL-SCNC: 4 MMOL/L (ref 3.5–5.3)
PROT SERPL-MCNC: 7.4 G/DL (ref 6.4–8.2)
PROT UR STRIP.AUTO-MCNC: NEGATIVE MG/DL
RBC # BLD AUTO: 3.16 X10*6/UL (ref 4.5–5.9)
RBC # UR STRIP.AUTO: NEGATIVE /UL
RSV RNA RESP QL NAA+PROBE: NOT DETECTED
SARS-COV-2 RNA RESP QL NAA+PROBE: NOT DETECTED
SODIUM SERPL-SCNC: 134 MMOL/L (ref 136–145)
SP GR UR STRIP.AUTO: 1.04
UROBILINOGEN UR STRIP.AUTO-MCNC: ABNORMAL MG/DL
WBC # BLD AUTO: 11.8 X10*3/UL (ref 4.4–11.3)

## 2024-12-13 PROCEDURE — 2500000002 HC RX 250 W HCPCS SELF ADMINISTERED DRUGS (ALT 637 FOR MEDICARE OP, ALT 636 FOR OP/ED): Performed by: NURSE PRACTITIONER

## 2024-12-13 PROCEDURE — 2500000004 HC RX 250 GENERAL PHARMACY W/ HCPCS (ALT 636 FOR OP/ED): Performed by: NURSE PRACTITIONER

## 2024-12-13 PROCEDURE — 96361 HYDRATE IV INFUSION ADD-ON: CPT

## 2024-12-13 PROCEDURE — 74177 CT ABD & PELVIS W/CONTRAST: CPT

## 2024-12-13 PROCEDURE — 71046 X-RAY EXAM CHEST 2 VIEWS: CPT

## 2024-12-13 PROCEDURE — 83735 ASSAY OF MAGNESIUM: CPT | Performed by: NURSE PRACTITIONER

## 2024-12-13 PROCEDURE — 36415 COLL VENOUS BLD VENIPUNCTURE: CPT | Performed by: NURSE PRACTITIONER

## 2024-12-13 PROCEDURE — 83880 ASSAY OF NATRIURETIC PEPTIDE: CPT | Performed by: NURSE PRACTITIONER

## 2024-12-13 PROCEDURE — 2550000001 HC RX 255 CONTRASTS: Performed by: EMERGENCY MEDICINE

## 2024-12-13 PROCEDURE — 85025 COMPLETE CBC W/AUTO DIFF WBC: CPT | Performed by: NURSE PRACTITIONER

## 2024-12-13 PROCEDURE — 84484 ASSAY OF TROPONIN QUANT: CPT | Performed by: NURSE PRACTITIONER

## 2024-12-13 PROCEDURE — 74177 CT ABD & PELVIS W/CONTRAST: CPT | Performed by: RADIOLOGY

## 2024-12-13 PROCEDURE — 84100 ASSAY OF PHOSPHORUS: CPT | Performed by: NURSE PRACTITIONER

## 2024-12-13 PROCEDURE — 93005 ELECTROCARDIOGRAM TRACING: CPT

## 2024-12-13 PROCEDURE — 96365 THER/PROPH/DIAG IV INF INIT: CPT

## 2024-12-13 PROCEDURE — 81003 URINALYSIS AUTO W/O SCOPE: CPT | Performed by: NURSE PRACTITIONER

## 2024-12-13 PROCEDURE — 83605 ASSAY OF LACTIC ACID: CPT | Performed by: NURSE PRACTITIONER

## 2024-12-13 PROCEDURE — 99285 EMERGENCY DEPT VISIT HI MDM: CPT | Mod: 25 | Performed by: EMERGENCY MEDICINE

## 2024-12-13 PROCEDURE — 80053 COMPREHEN METABOLIC PANEL: CPT | Performed by: NURSE PRACTITIONER

## 2024-12-13 PROCEDURE — 71046 X-RAY EXAM CHEST 2 VIEWS: CPT | Performed by: RADIOLOGY

## 2024-12-13 PROCEDURE — 87637 SARSCOV2&INF A&B&RSV AMP PRB: CPT | Performed by: NURSE PRACTITIONER

## 2024-12-13 RX ORDER — AZITHROMYCIN 250 MG/1
500 TABLET, FILM COATED ORAL ONCE
Status: COMPLETED | OUTPATIENT
Start: 2024-12-13 | End: 2024-12-13

## 2024-12-13 RX ORDER — CEFTRIAXONE 1 G/50ML
1 INJECTION, SOLUTION INTRAVENOUS ONCE
Status: COMPLETED | OUTPATIENT
Start: 2024-12-13 | End: 2024-12-13

## 2024-12-13 RX ORDER — AMOXICILLIN AND CLAVULANATE POTASSIUM 875; 125 MG/1; MG/1
875 TABLET, FILM COATED ORAL EVERY 12 HOURS
Qty: 14 TABLET | Refills: 0 | Status: SHIPPED | OUTPATIENT
Start: 2024-12-13 | End: 2024-12-20

## 2024-12-13 RX ORDER — AZITHROMYCIN 250 MG/1
TABLET, FILM COATED ORAL
Qty: 6 TABLET | Refills: 0 | Status: SHIPPED | OUTPATIENT
Start: 2024-12-13 | End: 2024-12-18

## 2024-12-13 RX ORDER — AZITHROMYCIN 250 MG/1
TABLET, FILM COATED ORAL
Qty: 6 TABLET | Refills: 0 | Status: SHIPPED | OUTPATIENT
Start: 2024-12-13 | End: 2024-12-13

## 2024-12-13 RX ORDER — CEFTRIAXONE 1 G/50ML
1 INJECTION, SOLUTION INTRAVENOUS ONCE
Status: DISCONTINUED | OUTPATIENT
Start: 2024-12-13 | End: 2024-12-13

## 2024-12-13 ASSESSMENT — PAIN - FUNCTIONAL ASSESSMENT: PAIN_FUNCTIONAL_ASSESSMENT: 0-10

## 2024-12-13 ASSESSMENT — PAIN SCALES - GENERAL: PAINLEVEL_OUTOF10: 0 - NO PAIN

## 2024-12-13 NOTE — ED TRIAGE NOTES
Nurse line told the patient to come into the ER. He has an US scheduled for liver next week. He has been becoming generally progressively weak over the last week with urine that is dark orange.

## 2024-12-13 NOTE — ED PROVIDER NOTES
HPI   Chief Complaint   Patient presents with    Weakness, Gen       This is a 64-year-old  male, with a past medical history of hypertension, elevated LFTs, and chronic diastolic CHF, that is presenting to the emergency room with complaints of generalized weakness for the past week.  Patient states that he is not able to do any of his activities without stopping and resting.  He denies any fevers or cold-like symptoms.  He is not having any chest pain or shortness of breath.  Patient reports that he does feel dizzy.  He has been trying to manage his blood pressure medications.  He is taking valsartan/hydrochlorothiazide 320-12.5.  He states that he believes that it is too much since he has lost a large amount of weight.  The patient denies any nausea or vomiting.  Patient reports he is not having any headaches or syncope.  Denies any palpitations, paresthesias, focal weakness.  The patient reports difficulty urinating but that has improved.  He noticed that his urine was very dark in color.  He is not having any flank pain or abdominal pain.  Denies any changes to his medications.  Denies any sick contacts.  He has had a rash to the bilateral lower extremities which most consists of patches of dry skin for the past week.  Denies any new lotions, detergents, perfumes, medications, or outdoor exposures.  Denies any difficulty swallowing or breathing.  Patient has had obesity symptoms before when he was suffering from kidney failure.  He was hospitalized for about 5 days.      History provided by:  Patient   used: No            Patient History   Past Medical History:   Diagnosis Date    Acute diastolic (congestive) heart failure 11/29/2017    Acute diastolic heart failure    Hypothyroidism, unspecified 05/16/2019    Adult hypothyroidism     Past Surgical History:   Procedure Laterality Date    BACK SURGERY  08/25/2017    Back Surgery     Family History   Family history unknown: Yes      Social History     Tobacco Use    Smoking status: Never     Passive exposure: Never    Smokeless tobacco: Never   Substance Use Topics    Alcohol use: Not Currently    Drug use: Not on file       Physical Exam   ED Triage Vitals [12/13/24 1442]   Temperature Heart Rate Respirations BP   36.4 °C (97.5 °F) 87 18 120/68      Pulse Ox Temp Source Heart Rate Source Patient Position   97 % Temporal Monitor --      BP Location FiO2 (%)     -- --       Physical Exam  Vitals and nursing note reviewed.   Constitutional:       Appearance: Normal appearance. He is normal weight.   HENT:      Head: Normocephalic and atraumatic.      Right Ear: Tympanic membrane normal.      Left Ear: Tympanic membrane normal.      Nose: Nose normal.      Mouth/Throat:      Mouth: Mucous membranes are moist.      Pharynx: Oropharynx is clear.   Eyes:      Extraocular Movements: Extraocular movements intact.      Conjunctiva/sclera: Conjunctivae normal.      Pupils: Pupils are equal, round, and reactive to light.   Cardiovascular:      Rate and Rhythm: Normal rate and regular rhythm.      Pulses: Normal pulses.   Pulmonary:      Effort: Pulmonary effort is normal.      Breath sounds: Normal breath sounds.   Abdominal:      General: Abdomen is flat. Bowel sounds are normal.      Palpations: Abdomen is soft.   Genitourinary:     Comments: No CVA tenderness or pubic pain.  Musculoskeletal:         General: Normal range of motion.      Right lower leg: Edema present.      Left lower leg: Edema present.      Comments: The patient has a 2+ lower extremity edema.  Nonpitting.  Patient has venous stasis changes noted bilaterally to the lower extremities.   Skin:     General: Skin is warm and dry.      Capillary Refill: Capillary refill takes less than 2 seconds.      Comments: Patient has multiple patches of dry skin noted to the bilateral lower extremities.   Neurological:      General: No focal deficit present.      Mental Status: He is alert and  oriented to person, place, and time.   Psychiatric:         Mood and Affect: Mood normal.           ED Course & MDM   ED Course as of 12/13/24 2228   Fri Dec 13, 2024   2219 ECG 12 lead  Twelve-lead EKG interpreted by me.  Sinus rhythm at a rate of 80.  NV interval is 147, QRS is 107, QT is 421, QTc is 46.  Normal axis.  Normal intervals.  No acute ischemia or injury pattern noted. [CT]      ED Course User Index  [CT] Karin ROBBINS Sammie, APRN-CNP         Diagnoses as of 12/13/24 2228   Generalized weakness   Alcoholic cirrhosis, unspecified whether ascites present (Multi)   Pneumonia of both lower lobes due to infectious organism                 No data recorded     Karime Coma Scale Score: 15 (12/13/24 1442 : Zoe Christopher RN)                           Medical Decision Making  The patient seen and evaluated with the attending physician, Dr. Mosher.  This bleeding.  He is here with complaints of generalized weakness.  Differential also includes viral illness, ACS, arrhythmia, anemia, dehydration, pneumonia, or other acute process.  The patient was placed on cardiac monitoring a saline lock was established and laboratory studies were drawn with results as noted.  A twelve-lead EKG was obtained and showed no acute ischemia or injury pattern noted.  Laboratory studies revealed that the patient had a mildly elevated leukocytosis.  Hemoglobin was 10.3 with a hematocrit of 28.5.  Lactic acid was elevated at 2.1.  The patient's liver enzymes were slightly higher than last evaluation in November.  Viral studies were obtained and were negative.  Initial and delta troponin were negative.  Repeat lactic acid was 2.0 after receiving 1 L of normal saline wide open for hydration.  An x-ray of the chest was performed and showed findings concerning for bibasilar pneumonia.  The patient was medicated empirically with azithromycin and Rocephin.  Based on the patient's elevated liver enzymes, CT was necessary to evaluate for  malignancy or cirrhosis.  The patient reports that he was a heavy drinker.  He has not had much alcohol in the past 2 weeks.  A CT of the abdomen pelvis was performed and showed findings consistent with cirrhosis with portal hypertension.  The patient was notified of the imaging and laboratory results.  We did not feel that the patient warranted inpatient hospitalization for further evaluation of his cirrhosis.  They cannot be performed on an outpatient basis.  The patient was agreeable with discharge planning.  He was referred to the hepatologist for further evaluation and treatment.  He is to refrain from alcohol consumption.  The patient was provided prescription for azithromycin and Augmentin for home administration.  He was given strict return precautions.  The patient was discharged in stable condition with computer discharge instructions given.        Procedure  Procedures     Karin Cochran, DEB-SHEREE  12/13/24 3212

## 2024-12-14 LAB — HOLD SPECIMEN: NORMAL

## 2024-12-16 LAB
ATRIAL RATE: 80 BPM
P AXIS: 30 DEGREES
PR INTERVAL: 147 MS
Q ONSET: 251 MS
QRS COUNT: 13 BEATS
QRS DURATION: 107 MS
QT INTERVAL: 421 MS
QTC CALCULATION(BAZETT): 486 MS
QTC FREDERICIA: 463 MS
R AXIS: -3 DEGREES
T AXIS: 73 DEGREES
T OFFSET: 461 MS
VENTRICULAR RATE: 80 BPM

## 2024-12-18 ENCOUNTER — HOSPITAL ENCOUNTER (OUTPATIENT)
Dept: RADIOLOGY | Facility: HOSPITAL | Age: 64
Discharge: HOME | End: 2024-12-18
Payer: COMMERCIAL

## 2024-12-18 DIAGNOSIS — R79.89 ELEVATED LFTS: ICD-10-CM

## 2024-12-18 PROCEDURE — 76705 ECHO EXAM OF ABDOMEN: CPT

## 2024-12-18 PROCEDURE — 76705 ECHO EXAM OF ABDOMEN: CPT | Performed by: STUDENT IN AN ORGANIZED HEALTH CARE EDUCATION/TRAINING PROGRAM

## 2024-12-20 ENCOUNTER — TELEPHONE (OUTPATIENT)
Dept: PRIMARY CARE | Facility: CLINIC | Age: 64
End: 2024-12-20
Payer: COMMERCIAL

## 2024-12-20 DIAGNOSIS — K74.60 CIRRHOSIS OF LIVER WITH ASCITES, UNSPECIFIED HEPATIC CIRRHOSIS TYPE (MULTI): Primary | ICD-10-CM

## 2024-12-20 DIAGNOSIS — R18.8 CIRRHOSIS OF LIVER WITH ASCITES, UNSPECIFIED HEPATIC CIRRHOSIS TYPE (MULTI): Primary | ICD-10-CM

## 2024-12-20 DIAGNOSIS — K76.6 PORTAL HYPERTENSION (MULTI): ICD-10-CM

## 2024-12-20 NOTE — TELEPHONE ENCOUNTER
----- Message from Kimberly Don sent at 12/20/2024  7:00 AM EST -----  US showing cirrhosis and ascites, this is consistent with read from CT scan in the ED this week, although other findings were also noted   We need to get him in with hepatology ASAP, orders in. He may even benefit from paracentesis, will assess at visit degree of ascites.   Can review in more detail at upcoming appt but please ask if having any fevers. There were questionable findings of cellulitis around his umbilicus, as well as any symptoms of GERD, the radiologist is recommending EGD but again we can assess in more detail at his visit.  If his edema around the abdomen worsens, he is experiencing fevers, rash along his abdomen, abdominal pain I would recommend going back to the ED based on the CT scan.

## 2024-12-27 ENCOUNTER — APPOINTMENT (OUTPATIENT)
Dept: PRIMARY CARE | Facility: CLINIC | Age: 64
End: 2024-12-27
Payer: COMMERCIAL

## 2024-12-27 VITALS
WEIGHT: 224 LBS | BODY MASS INDEX: 30.34 KG/M2 | HEART RATE: 67 BPM | HEIGHT: 72 IN | SYSTOLIC BLOOD PRESSURE: 118 MMHG | DIASTOLIC BLOOD PRESSURE: 68 MMHG | OXYGEN SATURATION: 97 %

## 2024-12-27 DIAGNOSIS — K70.31 ALCOHOLIC CIRRHOSIS OF LIVER WITH ASCITES (MULTI): Primary | ICD-10-CM

## 2024-12-27 DIAGNOSIS — R21 RASH: ICD-10-CM

## 2024-12-27 DIAGNOSIS — K21.9 GASTROESOPHAGEAL REFLUX DISEASE WITHOUT ESOPHAGITIS: ICD-10-CM

## 2024-12-27 PROCEDURE — 99214 OFFICE O/P EST MOD 30 MIN: CPT | Performed by: STUDENT IN AN ORGANIZED HEALTH CARE EDUCATION/TRAINING PROGRAM

## 2024-12-27 PROCEDURE — 1123F ACP DISCUSS/DSCN MKR DOCD: CPT | Performed by: STUDENT IN AN ORGANIZED HEALTH CARE EDUCATION/TRAINING PROGRAM

## 2024-12-27 PROCEDURE — 3074F SYST BP LT 130 MM HG: CPT | Performed by: STUDENT IN AN ORGANIZED HEALTH CARE EDUCATION/TRAINING PROGRAM

## 2024-12-27 PROCEDURE — 3078F DIAST BP <80 MM HG: CPT | Performed by: STUDENT IN AN ORGANIZED HEALTH CARE EDUCATION/TRAINING PROGRAM

## 2024-12-27 PROCEDURE — 3008F BODY MASS INDEX DOCD: CPT | Performed by: STUDENT IN AN ORGANIZED HEALTH CARE EDUCATION/TRAINING PROGRAM

## 2024-12-27 PROCEDURE — 1159F MED LIST DOCD IN RCRD: CPT | Performed by: STUDENT IN AN ORGANIZED HEALTH CARE EDUCATION/TRAINING PROGRAM

## 2024-12-27 PROCEDURE — 1036F TOBACCO NON-USER: CPT | Performed by: STUDENT IN AN ORGANIZED HEALTH CARE EDUCATION/TRAINING PROGRAM

## 2024-12-27 RX ORDER — FUROSEMIDE 40 MG/1
40 TABLET ORAL DAILY
Qty: 30 TABLET | Refills: 0 | Status: SHIPPED | OUTPATIENT
Start: 2024-12-27 | End: 2025-12-27

## 2024-12-27 RX ORDER — OMEPRAZOLE 40 MG/1
40 CAPSULE, DELAYED RELEASE ORAL
Qty: 90 CAPSULE | Refills: 3 | Status: SHIPPED | OUTPATIENT
Start: 2024-12-27 | End: 2025-12-22

## 2024-12-27 RX ORDER — TRIAMCINOLONE ACETONIDE 1 MG/G
CREAM TOPICAL 2 TIMES DAILY
Qty: 30 G | Refills: 1 | Status: SHIPPED | OUTPATIENT
Start: 2024-12-27

## 2024-12-27 ASSESSMENT — ENCOUNTER SYMPTOMS
COUGH: 0
LOSS OF SENSATION IN FEET: 0
CONFUSION: 0
APPETITE CHANGE: 0
DECREASED CONCENTRATION: 0
WHEEZING: 0
ACTIVITY CHANGE: 0
ABDOMINAL DISTENTION: 1
CONSTIPATION: 0
FATIGUE: 0
OCCASIONAL FEELINGS OF UNSTEADINESS: 0
DIARRHEA: 0
DEPRESSION: 1
NAUSEA: 0
FEVER: 0
SHORTNESS OF BREATH: 0
UNEXPECTED WEIGHT CHANGE: 0
PALPITATIONS: 0

## 2024-12-27 ASSESSMENT — COLUMBIA-SUICIDE SEVERITY RATING SCALE - C-SSRS
2. HAVE YOU ACTUALLY HAD ANY THOUGHTS OF KILLING YOURSELF?: NO
6. HAVE YOU EVER DONE ANYTHING, STARTED TO DO ANYTHING, OR PREPARED TO DO ANYTHING TO END YOUR LIFE?: NO
1. IN THE PAST MONTH, HAVE YOU WISHED YOU WERE DEAD OR WISHED YOU COULD GO TO SLEEP AND NOT WAKE UP?: NO

## 2024-12-27 ASSESSMENT — PATIENT HEALTH QUESTIONNAIRE - PHQ9
1. LITTLE INTEREST OR PLEASURE IN DOING THINGS: NOT AT ALL
2. FEELING DOWN, DEPRESSED OR HOPELESS: NOT AT ALL
SUM OF ALL RESPONSES TO PHQ9 QUESTIONS 1 AND 2: 0

## 2024-12-27 NOTE — ASSESSMENT & PLAN NOTE
AVS with cirrhosis diet protocol  Avoidance of hepatotoxins- primary being alcohol, cessation is recommended, discussed local options for help including AA and IOP programs, information packets offered   Patient is feeling strong in sobriety at this time, now 1 month without drinking  Tylenol should also be avoided as well as certain herbal supplements  Hepatology referral is pending, stressed importance of follow through, offered our scheduling services to help set up appt for him  Will try to get fluid off without paracentesis, per pt preference, starting with lasix, 2 weeks follow up with labs

## 2024-12-27 NOTE — PROGRESS NOTES
Patient Name:  Dalton Valverde  MRN:  [id number]  :  1960    Subjective   Patient ID: Dalton Valverde is a 64 y.o. male who presents for Follow-up (Pt here to follow up and go over labs and rads and discuss ascites. Also now has welts all over his arms, back and chest.  Discuss his bp meds. Urine stream is weak.).    HPI     63 yo male presents for ED follow up     Patient treated for pnuemonia in the ED- lactic acid downtrending with fluids- given 1 dose of rocephin and outpatient oral azithromycin and augmentin    CT in the ED showing- cirrhosis and portal HTN, ascites  Possible gastritis, recommendation EGD  Possible pancreatitis, peripancreatic edema- recommendation lipase level   Cholelithiasis  Possible periumbilical regimen edema- questioning cellulitis     Last drink 1 month ago - denies withdrawal    Since admission having a lot of welts across his body very itchy  Benadryl and cortisone help a little     Review of Systems   Constitutional:  Negative for activity change, appetite change, fatigue, fever and unexpected weight change.   Respiratory:  Negative for cough, shortness of breath and wheezing.    Cardiovascular:  Negative for chest pain, palpitations and leg swelling.   Gastrointestinal:  Positive for abdominal distention. Negative for constipation, diarrhea and nausea.   Genitourinary:         Weak urine stream    Skin:  Positive for rash.   Allergic/Immunologic: Negative for immunocompromised state.   Psychiatric/Behavioral:  Negative for confusion and decreased concentration.      Objective   /68 (BP Location: Right arm, Patient Position: Sitting)   Pulse 67   Ht 1.829 m (6')   Wt 102 kg (224 lb)   SpO2 97%   BMI 30.38 kg/m²     Physical Exam  Constitutional:       Appearance: Normal appearance.   HENT:      Head: Normocephalic and atraumatic.   Cardiovascular:      Rate and Rhythm: Normal rate and regular rhythm.   Abdominal:      General: Bowel sounds are normal. There is  distension.      Tenderness: There is no right CVA tenderness or left CVA tenderness.      Comments: ascites   Musculoskeletal:      Right lower leg: No edema.      Left lower leg: No edema.   Skin:     Coloration: Skin is not jaundiced.      Comments: No cellulitis noted along abdomin, nothing to correlate with CT finding  No RUQ tenderness   Neurological:      Mental Status: He is alert and oriented to person, place, and time.   Psychiatric:         Mood and Affect: Mood normal.         Behavior: Behavior normal.     Assessment/Plan   Problem List Items Addressed This Visit             ICD-10-CM    GERD (gastroesophageal reflux disease) K21.9    Relevant Medications    omeprazole (PriLOSEC) 40 mg DR capsule    Alcoholic cirrhosis of liver with ascites (Multi) - Primary (Chronic) K70.31     AVS with cirrhosis diet protocol  Avoidance of hepatotoxins- primary being alcohol, cessation is recommended, discussed local options for help including AA and IOP programs, information packets offered   Patient is feeling strong in sobriety at this time, now 1 month without drinking  Tylenol should also be avoided as well as certain herbal supplements  Hepatology referral is pending, stressed importance of follow through, offered our scheduling services to help set up appt for him  Will try to get fluid off without paracentesis, per pt preference, starting with lasix, 2 weeks follow up with labs          Relevant Medications    furosemide (Lasix) 40 mg tablet    Other Relevant Orders    Comprehensive metabolic panel    Follow Up In [facility] - PCP - Established    Lipase     Other Visit Diagnoses         Codes    Rash     R21    Relevant Medications    triamcinolone (Kenalog) 0.1 % cream

## 2025-01-10 ENCOUNTER — TELEPHONE (OUTPATIENT)
Dept: PRIMARY CARE | Facility: CLINIC | Age: 65
End: 2025-01-10

## 2025-01-10 ENCOUNTER — APPOINTMENT (OUTPATIENT)
Dept: PRIMARY CARE | Facility: CLINIC | Age: 65
End: 2025-01-10
Payer: COMMERCIAL

## 2025-01-10 ENCOUNTER — LAB (OUTPATIENT)
Dept: LAB | Facility: LAB | Age: 65
End: 2025-01-10
Payer: COMMERCIAL

## 2025-01-10 VITALS
OXYGEN SATURATION: 98 % | SYSTOLIC BLOOD PRESSURE: 135 MMHG | WEIGHT: 216 LBS | DIASTOLIC BLOOD PRESSURE: 68 MMHG | BODY MASS INDEX: 29.26 KG/M2 | HEART RATE: 90 BPM | HEIGHT: 72 IN

## 2025-01-10 DIAGNOSIS — R39.198 DIFFICULTY URINATING: Primary | ICD-10-CM

## 2025-01-10 DIAGNOSIS — K70.31 ALCOHOLIC CIRRHOSIS OF LIVER WITH ASCITES (MULTI): ICD-10-CM

## 2025-01-10 LAB
ALBUMIN SERPL BCP-MCNC: 2.9 G/DL (ref 3.4–5)
ALP SERPL-CCNC: 128 U/L (ref 33–136)
ALT SERPL W P-5'-P-CCNC: 22 U/L (ref 10–52)
ANION GAP SERPL CALC-SCNC: 9 MMOL/L (ref 10–20)
AST SERPL W P-5'-P-CCNC: 60 U/L (ref 9–39)
BILIRUB SERPL-MCNC: 1.6 MG/DL (ref 0–1.2)
BUN SERPL-MCNC: 12 MG/DL (ref 6–23)
CALCIUM SERPL-MCNC: 8.5 MG/DL (ref 8.6–10.3)
CHLORIDE SERPL-SCNC: 105 MMOL/L (ref 98–107)
CO2 SERPL-SCNC: 25 MMOL/L (ref 21–32)
CREAT SERPL-MCNC: 0.75 MG/DL (ref 0.5–1.3)
EGFRCR SERPLBLD CKD-EPI 2021: >90 ML/MIN/1.73M*2
GLUCOSE SERPL-MCNC: 104 MG/DL (ref 74–99)
LIPASE SERPL-CCNC: 200 U/L (ref 9–82)
POTASSIUM SERPL-SCNC: 4 MMOL/L (ref 3.5–5.3)
PROT SERPL-MCNC: 7.3 G/DL (ref 6.4–8.2)
SODIUM SERPL-SCNC: 135 MMOL/L (ref 136–145)

## 2025-01-10 PROCEDURE — 99214 OFFICE O/P EST MOD 30 MIN: CPT | Performed by: STUDENT IN AN ORGANIZED HEALTH CARE EDUCATION/TRAINING PROGRAM

## 2025-01-10 PROCEDURE — 3078F DIAST BP <80 MM HG: CPT | Performed by: STUDENT IN AN ORGANIZED HEALTH CARE EDUCATION/TRAINING PROGRAM

## 2025-01-10 PROCEDURE — 1159F MED LIST DOCD IN RCRD: CPT | Performed by: STUDENT IN AN ORGANIZED HEALTH CARE EDUCATION/TRAINING PROGRAM

## 2025-01-10 PROCEDURE — 3008F BODY MASS INDEX DOCD: CPT | Performed by: STUDENT IN AN ORGANIZED HEALTH CARE EDUCATION/TRAINING PROGRAM

## 2025-01-10 PROCEDURE — 83690 ASSAY OF LIPASE: CPT

## 2025-01-10 PROCEDURE — 80053 COMPREHEN METABOLIC PANEL: CPT

## 2025-01-10 PROCEDURE — 1036F TOBACCO NON-USER: CPT | Performed by: STUDENT IN AN ORGANIZED HEALTH CARE EDUCATION/TRAINING PROGRAM

## 2025-01-10 PROCEDURE — 1160F RVW MEDS BY RX/DR IN RCRD: CPT | Performed by: STUDENT IN AN ORGANIZED HEALTH CARE EDUCATION/TRAINING PROGRAM

## 2025-01-10 PROCEDURE — 1123F ACP DISCUSS/DSCN MKR DOCD: CPT | Performed by: STUDENT IN AN ORGANIZED HEALTH CARE EDUCATION/TRAINING PROGRAM

## 2025-01-10 PROCEDURE — 3075F SYST BP GE 130 - 139MM HG: CPT | Performed by: STUDENT IN AN ORGANIZED HEALTH CARE EDUCATION/TRAINING PROGRAM

## 2025-01-10 RX ORDER — TAMSULOSIN HYDROCHLORIDE 0.4 MG/1
0.4 CAPSULE ORAL DAILY
Qty: 30 CAPSULE | Refills: 0 | Status: SHIPPED | OUTPATIENT
Start: 2025-01-10 | End: 2026-01-10

## 2025-01-10 ASSESSMENT — ENCOUNTER SYMPTOMS
FEVER: 0
ACTIVITY CHANGE: 0
WHEEZING: 0
HEMATURIA: 0
DEPRESSION: 0
FLANK PAIN: 0
LOSS OF SENSATION IN FEET: 0
OCCASIONAL FEELINGS OF UNSTEADINESS: 0
DIFFICULTY URINATING: 1
DYSURIA: 0
FATIGUE: 1
SHORTNESS OF BREATH: 0
FREQUENCY: 0
APPETITE CHANGE: 0
PALPITATIONS: 0

## 2025-01-10 ASSESSMENT — COLUMBIA-SUICIDE SEVERITY RATING SCALE - C-SSRS
6. HAVE YOU EVER DONE ANYTHING, STARTED TO DO ANYTHING, OR PREPARED TO DO ANYTHING TO END YOUR LIFE?: NO
2. HAVE YOU ACTUALLY HAD ANY THOUGHTS OF KILLING YOURSELF?: NO
1. IN THE PAST MONTH, HAVE YOU WISHED YOU WERE DEAD OR WISHED YOU COULD GO TO SLEEP AND NOT WAKE UP?: NO

## 2025-01-10 NOTE — PROGRESS NOTES
Patient Name:  Dalton Valverde  MRN:  [id number]  :  1960    Subjective   Patient ID: Dalton Valverde is a 64 y.o. male who presents for Follow-up (Pt here to follow up, wants to change bp meds. Having trouble urinating.).    HPI     LV with ongoing ascites concerns patient was doing well with sobriety, working on cirrhotic diet, and we were starting to add on diuretics    He did not get follow up labs for today's visit   Patient was able to get an appt with Dr. Lopez 3/11/25    Reports much improvement in abdominal swelling, he is down 6  He is reporting though despite the diuretic he does not feel like he is urinating much   Reports urinating every 1-2 hours  But stream seems weak    He wants to change his BP medication, reports he used to be on combination benicar     Review of Systems   Constitutional:  Positive for fatigue. Negative for activity change, appetite change and fever.   Respiratory:  Negative for shortness of breath and wheezing.    Cardiovascular:  Negative for chest pain, palpitations and leg swelling.   Genitourinary:  Positive for difficulty urinating. Negative for dysuria, flank pain, frequency, hematuria, penile pain, testicular pain and urgency.   Allergic/Immunologic: Negative for immunocompromised state.     Objective   /68 (BP Location: Right arm, Patient Position: Sitting)   Pulse 90   Ht 1.829 m (6')   Wt 98 kg (216 lb)   SpO2 98%   BMI 29.29 kg/m²     Physical Exam  Constitutional:       Appearance: Normal appearance.   HENT:      Head: Normocephalic and atraumatic.   Cardiovascular:      Rate and Rhythm: Normal rate and regular rhythm.   Pulmonary:      Effort: Pulmonary effort is normal.   Abdominal:      Tenderness: There is no abdominal tenderness. There is no right CVA tenderness, guarding or rebound.      Comments: Down 6lbs, ascites is much reduced   Skin:     Coloration: Skin is not jaundiced or pale.   Neurological:      Mental Status: He is alert and oriented to  person, place, and time.   Psychiatric:         Mood and Affect: Mood normal.         Behavior: Behavior normal.     Assessment/Plan   Problem List Items Addressed This Visit             ICD-10-CM    Chronic diastolic congestive heart failure (Chronic) I50.32     Last echo in 2017, ordered last year but patient did not complete      ACE/ARB:Yes, describe: valsartan in combination tablet   Arni-No  SGLT2-No  Diuretic- Yes, describe: lasix  BB-No  MRA-No    Working on hepatic and heart healthy diet            Alcoholic cirrhosis of liver with ascites (Multi) (Chronic) K70.31     Doing well with cirrhosis diet  Hepatotoxin avoidance, doing well with sobriety  Hepatology est in March  Much improvement in ascites with lasix addition, needs to complete labs to assess kidney function and K          Relevant Orders    Follow Up In [facility] - PCP - Established    Difficulty urinating - Primary (Chronic) R39.198     And incomplete bladder emptying  PSA WNL  Discussed trial of flomax  If no improvement recommending urology referral          Relevant Medications    tamsulosin (Flomax) 0.4 mg 24 hr capsule    Portal hypertension (Multi) K76.6     Alcohol cessation  Hepatology referral pending  Review of imaging with patient  On lasix improvement ascites   BP controlled

## 2025-01-10 NOTE — TELEPHONE ENCOUNTER
----- Message from Kimberly Don sent at 1/10/2025 11:13 AM EST -----  Improving liver enzymes but still elevated, continue great work with sobriety  Albumin and calcium low likely 2/2 cirrhosis  Lipase was rather elevated, any middle abdominal pain to indicate pancreatitis?

## 2025-01-10 NOTE — ASSESSMENT & PLAN NOTE
Doing well with cirrhosis diet  Hepatotoxin avoidance, doing well with sobriety  Hepatology est in March  Much improvement in ascites with lasix addition, needs to complete labs to assess kidney function and K

## 2025-01-10 NOTE — ASSESSMENT & PLAN NOTE
And incomplete bladder emptying  PSA WNL  Discussed trial of flomax  If no improvement recommending urology referral

## 2025-01-13 PROBLEM — K76.6 PORTAL HYPERTENSION (MULTI): Status: ACTIVE | Noted: 2025-01-13

## 2025-01-13 NOTE — ASSESSMENT & PLAN NOTE
Alcohol cessation  Hepatology referral pending  Review of imaging with patient  On lasix improvement ascites   BP controlled

## 2025-01-13 NOTE — ASSESSMENT & PLAN NOTE
Last echo in 2017, ordered last year but patient did not complete      ACE/ARB:Yes, describe: valsartan in combination tablet   Arni-No  SGLT2-No  Diuretic- Yes, describe: lasix  BB-No  MRA-No    Working on hepatic and heart healthy diet

## 2025-02-10 ENCOUNTER — APPOINTMENT (OUTPATIENT)
Dept: PRIMARY CARE | Facility: CLINIC | Age: 65
End: 2025-02-10
Payer: COMMERCIAL

## 2025-02-18 PROBLEM — L98.9 CHANGING SKIN LESION: Status: ACTIVE | Noted: 2025-02-18

## 2025-02-19 ENCOUNTER — APPOINTMENT (OUTPATIENT)
Dept: PRIMARY CARE | Facility: CLINIC | Age: 65
End: 2025-02-19
Payer: COMMERCIAL

## 2025-02-19 VITALS
WEIGHT: 224 LBS | BODY MASS INDEX: 30.34 KG/M2 | HEIGHT: 72 IN | HEART RATE: 79 BPM | OXYGEN SATURATION: 100 % | DIASTOLIC BLOOD PRESSURE: 71 MMHG | SYSTOLIC BLOOD PRESSURE: 123 MMHG

## 2025-02-19 DIAGNOSIS — R39.198 DIFFICULTY URINATING: ICD-10-CM

## 2025-02-19 DIAGNOSIS — K70.31 ALCOHOLIC CIRRHOSIS OF LIVER WITH ASCITES (MULTI): Primary | ICD-10-CM

## 2025-02-19 DIAGNOSIS — K21.9 GASTROESOPHAGEAL REFLUX DISEASE WITHOUT ESOPHAGITIS: Chronic | ICD-10-CM

## 2025-02-19 DIAGNOSIS — G47.00 INSOMNIA, UNSPECIFIED TYPE: ICD-10-CM

## 2025-02-19 PROCEDURE — 1123F ACP DISCUSS/DSCN MKR DOCD: CPT | Performed by: STUDENT IN AN ORGANIZED HEALTH CARE EDUCATION/TRAINING PROGRAM

## 2025-02-19 PROCEDURE — 99214 OFFICE O/P EST MOD 30 MIN: CPT | Performed by: STUDENT IN AN ORGANIZED HEALTH CARE EDUCATION/TRAINING PROGRAM

## 2025-02-19 PROCEDURE — 1159F MED LIST DOCD IN RCRD: CPT | Performed by: STUDENT IN AN ORGANIZED HEALTH CARE EDUCATION/TRAINING PROGRAM

## 2025-02-19 PROCEDURE — 1036F TOBACCO NON-USER: CPT | Performed by: STUDENT IN AN ORGANIZED HEALTH CARE EDUCATION/TRAINING PROGRAM

## 2025-02-19 PROCEDURE — 3074F SYST BP LT 130 MM HG: CPT | Performed by: STUDENT IN AN ORGANIZED HEALTH CARE EDUCATION/TRAINING PROGRAM

## 2025-02-19 PROCEDURE — 3008F BODY MASS INDEX DOCD: CPT | Performed by: STUDENT IN AN ORGANIZED HEALTH CARE EDUCATION/TRAINING PROGRAM

## 2025-02-19 PROCEDURE — 3078F DIAST BP <80 MM HG: CPT | Performed by: STUDENT IN AN ORGANIZED HEALTH CARE EDUCATION/TRAINING PROGRAM

## 2025-02-19 RX ORDER — MELATONIN 3 MG
3 CAPSULE ORAL NIGHTLY
Qty: 90 CAPSULE | Refills: 3 | Status: SHIPPED | OUTPATIENT
Start: 2025-02-19

## 2025-02-19 RX ORDER — FUROSEMIDE 40 MG/1
40 TABLET ORAL DAILY
Qty: 30 TABLET | Refills: 3 | Status: SHIPPED | OUTPATIENT
Start: 2025-02-19 | End: 2026-02-19

## 2025-02-19 RX ORDER — MAGNESIUM GLYCINATE 100 MG
100 CAPSULE ORAL NIGHTLY
Qty: 90 CAPSULE | Refills: 3 | Status: SHIPPED | OUTPATIENT
Start: 2025-02-19

## 2025-02-19 RX ORDER — HYDROXYZINE HYDROCHLORIDE 25 MG/1
25 TABLET, FILM COATED ORAL NIGHTLY PRN
Qty: 30 TABLET | Refills: 0 | Status: CANCELLED | OUTPATIENT
Start: 2025-02-19 | End: 2025-03-21

## 2025-02-19 ASSESSMENT — ENCOUNTER SYMPTOMS
HEADACHES: 0
DEPRESSION: 1
OCCASIONAL FEELINGS OF UNSTEADINESS: 0
CONFUSION: 0
LOSS OF SENSATION IN FEET: 0
SHORTNESS OF BREATH: 0
DECREASED CONCENTRATION: 1
SLEEP DISTURBANCE: 1
FATIGUE: 1
FACIAL ASYMMETRY: 0
DYSPHORIC MOOD: 1
PALPITATIONS: 0

## 2025-02-19 ASSESSMENT — PATIENT HEALTH QUESTIONNAIRE - PHQ9
10. IF YOU CHECKED OFF ANY PROBLEMS, HOW DIFFICULT HAVE THESE PROBLEMS MADE IT FOR YOU TO DO YOUR WORK, TAKE CARE OF THINGS AT HOME, OR GET ALONG WITH OTHER PEOPLE: SOMEWHAT DIFFICULT
SUM OF ALL RESPONSES TO PHQ9 QUESTIONS 1 AND 2: 2
7. TROUBLE CONCENTRATING ON THINGS, SUCH AS READING THE NEWSPAPER OR WATCHING TELEVISION: SEVERAL DAYS
5. POOR APPETITE OR OVEREATING: NEARLY EVERY DAY
2. FEELING DOWN, DEPRESSED OR HOPELESS: MORE THAN HALF THE DAYS
3. TROUBLE FALLING OR STAYING ASLEEP OR SLEEPING TOO MUCH: NEARLY EVERY DAY
1. LITTLE INTEREST OR PLEASURE IN DOING THINGS: NOT AT ALL
4. FEELING TIRED OR HAVING LITTLE ENERGY: NEARLY EVERY DAY

## 2025-02-19 ASSESSMENT — COLUMBIA-SUICIDE SEVERITY RATING SCALE - C-SSRS
2. HAVE YOU ACTUALLY HAD ANY THOUGHTS OF KILLING YOURSELF?: NO
1. IN THE PAST MONTH, HAVE YOU WISHED YOU WERE DEAD OR WISHED YOU COULD GO TO SLEEP AND NOT WAKE UP?: NO
1. IN THE PAST MONTH, HAVE YOU WISHED YOU WERE DEAD OR WISHED YOU COULD GO TO SLEEP AND NOT WAKE UP?: NO
6. HAVE YOU EVER DONE ANYTHING, STARTED TO DO ANYTHING, OR PREPARED TO DO ANYTHING TO END YOUR LIFE?: NO
6. HAVE YOU EVER DONE ANYTHING, STARTED TO DO ANYTHING, OR PREPARED TO DO ANYTHING TO END YOUR LIFE?: NO
2. HAVE YOU ACTUALLY HAD ANY THOUGHTS OF KILLING YOURSELF?: NO

## 2025-02-19 NOTE — ASSESSMENT & PLAN NOTE
Review of sleep hygiene   Melatonin and magnesium sent   Avoidance of trazodone given liver function, possibly can use atarax for backup

## 2025-02-19 NOTE — ASSESSMENT & PLAN NOTE
Doing well with cirrhotic diet and stable in sobriety   Hepatology visit in March   Continue lasix, improved ascites and BMP stable

## 2025-02-19 NOTE — PATIENT INSTRUCTIONS
Melatonin and Magnesium for sleep  Work on less napping, going to bed at the same time, keeping room dark and cool   Continue prilosec, work on cutting back on acidic foods  Upcoming hepatology appointment  Continue great work with sobriety!

## 2025-02-19 NOTE — PROGRESS NOTES
Patient Name:  Dalton Valverde  MRN:  [id number]  :  1960    Subjective   Patient ID: Dalton Valverde is a 64 y.o. male who presents for Follow-up (Pt here for follow up, tired all the time and not sleeping well.  Took the lasix and had an adverse rxn.).    HPI     63 yo male presents for follow up     LV we were having great progress with ascites with lasix  Started flomax for urinary issues   He reports since last visit though had an episode of feeling dizzy after the medication while driving so he has discontinued it and is concerned about taking it again     Unable to sleep   Thinks he needs a new mattress  He is napping for hours during the day     Review of Systems   Constitutional:  Positive for fatigue.   Respiratory:  Negative for shortness of breath.    Cardiovascular:  Negative for chest pain, palpitations and leg swelling.   Neurological:  Negative for facial asymmetry and headaches.   Psychiatric/Behavioral:  Positive for decreased concentration, dysphoric mood and sleep disturbance. Negative for confusion and suicidal ideas.      Objective   /71 (BP Location: Left arm, Patient Position: Sitting)   Pulse 79   Ht 1.829 m (6')   Wt 102 kg (224 lb)   SpO2 100%   BMI 30.38 kg/m²     Physical Exam  Constitutional:       Appearance: Normal appearance.   HENT:      Head: Normocephalic and atraumatic.   Cardiovascular:      Rate and Rhythm: Normal rate and regular rhythm.   Pulmonary:      Effort: Pulmonary effort is normal. No respiratory distress.   Skin:     Comments: Jaundice resolved   Neurological:      Mental Status: He is alert.   Psychiatric:         Mood and Affect: Mood normal.       Assessment/Plan   Problem List Items Addressed This Visit             ICD-10-CM    GERD (gastroesophageal reflux disease) (Chronic) K21.9     Continue PPI  Watch citric intake          Alcoholic cirrhosis of liver with ascites (Multi) - Primary (Chronic) K70.31     Doing well with cirrhotic diet and stable  in sobriety   Hepatology visit in March   Continue lasix, improved ascites and BMP stable          Relevant Medications    furosemide (Lasix) 40 mg tablet    Other Relevant Orders    Follow Up In [facility] - PCP - Established    Difficulty urinating (Chronic) R39.198     Adverse reaction flomax- dizziness   PSA WNL   Patient wants to hold on urology referral          Insomnia (Chronic) G47.00     Review of sleep hygiene   Melatonin and magnesium sent   Avoidance of trazodone given liver function, possibly can use atarax for backup          Relevant Medications    melatonin 3 mg capsule    magnesium glycinate 100 mg magnesium capsule

## 2025-03-11 ENCOUNTER — OFFICE VISIT (OUTPATIENT)
Dept: GASTROENTEROLOGY | Facility: CLINIC | Age: 65
End: 2025-03-11
Payer: COMMERCIAL

## 2025-03-11 VITALS
OXYGEN SATURATION: 97 % | HEIGHT: 72 IN | HEART RATE: 74 BPM | DIASTOLIC BLOOD PRESSURE: 58 MMHG | SYSTOLIC BLOOD PRESSURE: 107 MMHG | BODY MASS INDEX: 29.93 KG/M2 | WEIGHT: 221 LBS

## 2025-03-11 DIAGNOSIS — K74.60 CIRRHOSIS OF LIVER WITH ASCITES, UNSPECIFIED HEPATIC CIRRHOSIS TYPE (MULTI): ICD-10-CM

## 2025-03-11 DIAGNOSIS — K74.60 ESOPHAGEAL VARICES IN CIRRHOSIS (MULTI): ICD-10-CM

## 2025-03-11 DIAGNOSIS — K76.6 PORTAL HYPERTENSION (MULTI): ICD-10-CM

## 2025-03-11 DIAGNOSIS — I85.10 ESOPHAGEAL VARICES IN CIRRHOSIS (MULTI): ICD-10-CM

## 2025-03-11 DIAGNOSIS — Z12.11 SCREEN FOR COLON CANCER: ICD-10-CM

## 2025-03-11 DIAGNOSIS — R18.8 CIRRHOSIS OF LIVER WITH ASCITES, UNSPECIFIED HEPATIC CIRRHOSIS TYPE (MULTI): ICD-10-CM

## 2025-03-11 PROCEDURE — 1126F AMNT PAIN NOTED NONE PRSNT: CPT | Performed by: INTERNAL MEDICINE

## 2025-03-11 PROCEDURE — 3074F SYST BP LT 130 MM HG: CPT | Performed by: INTERNAL MEDICINE

## 2025-03-11 PROCEDURE — 3078F DIAST BP <80 MM HG: CPT | Performed by: INTERNAL MEDICINE

## 2025-03-11 PROCEDURE — 1159F MED LIST DOCD IN RCRD: CPT | Performed by: INTERNAL MEDICINE

## 2025-03-11 PROCEDURE — 1036F TOBACCO NON-USER: CPT | Performed by: INTERNAL MEDICINE

## 2025-03-11 PROCEDURE — 99215 OFFICE O/P EST HI 40 MIN: CPT | Performed by: INTERNAL MEDICINE

## 2025-03-11 PROCEDURE — 3008F BODY MASS INDEX DOCD: CPT | Performed by: INTERNAL MEDICINE

## 2025-03-11 PROCEDURE — 1123F ACP DISCUSS/DSCN MKR DOCD: CPT | Performed by: INTERNAL MEDICINE

## 2025-03-11 ASSESSMENT — PAIN SCALES - GENERAL: PAINLEVEL_OUTOF10: 0-NO PAIN

## 2025-03-11 NOTE — PATIENT INSTRUCTIONS
Work on diet, weight loss and exercise.    Follow a low-sodium, 2000 mg daily diet.    Get lab tests.    Avoid all alcohol use.    Get colonoscopy and upper endoscopy done at [facility].

## 2025-03-11 NOTE — PROGRESS NOTES
HEPATOLOGY RETURN PATIENT VISIT    March 11, 2025    Dr. Kimberly Don       Patient Name:   ZULY THAYER  Medical Record Number: [id number]  YOB: 1960    Dear Dr. Don,    I saw Zuly Thayer (along with his sister) for follow-up evaluation in the [facility] ([facility]). History and physical examination was performed. Pertinent available laboratory, imaging, pathology results were reviewed.  I spent over 25 minutes reviewing his records in preparation for this visit.    He has not seen me in clinic since 7/7/2022.    History of Present Illness:   The patient is a 64 year old white male who is referred for follow-up evaluation of fatty liver disease, elevated liver function tests, cirrhosis and ascites.    The patient has a long history of elevated LFTs.  He had a liver ultrasound in 2019 that showed fatty liver disease.  For these reasons, he was referred to me for further evaluation.    The patient has risk factors for fatty liver disease including hyperlipidemia, and being overweight.  He was on a statin and fish oil.  He apparently stopped the statin a few years ago.  He has lost about 20-25 pounds over the last few years with active changes in his diet.  He has no known history of diabetes.    He also has a history of at least moderate alcohol use.  He was drinking about 6 beers 2 or 3 times a week when he first saw me.    The patient denied any past history of acute hepatitis or jaundice.      He has never had any jaundice, hepatic encephalopathy, or variceal bleeding. He denied ever having a liver biopsy.    He initially saw me in consultation on 7/7/2022.  At that time, I did additional evaluation (see results below).  I recommended that he avoid all alcohol use and work on fatty liver disease risk factors.  I asked him to get a FibroScan ultrasound so that we could make further plans.  He never got that test done.  Instead, he decides to return now,  almost 3 years later.  In fact, he does not even recall seeing me in the past.    He was seen in the emergency room 12/13/2024 with weakness and weight loss after starting diuretics.  He told the emergency room that he had not been drinking much prior to that visit.  Imaging study showed cirrhosis and portal hypertension.  Despite worsening anemia and possible pancreatitis, he was not admitted to the hospital. He was told to come see me for evaluation.    He has apparently been following with his PCP.  She has him on diuretics for ascites.  They started him on Lasix.  He apparently got some itching and was switched to hydrochlorothiazide and potassium.  He was told to follow a low sodium diet.     He is reportedly sober from alcohol since late 11/2024.    He presented today for follow-up evaluation.  He denied any specific liver-related complaints. He does get some intermittent pruritus on his back.    Past Medical/Surgical History:   ? Elevated LFTs (790.6) (R79.89)  ? Fatty infiltration of liver (571.8) (K76.0)  ? History of colon polyps (V12.72) (Z86.010)  ? Abnormal fasting glucose (790.29) (R73.01)  ? Acute maxillary sinusitis (461.0) (J01.00)  ? Chronic diastolic congestive heart failure (428.32,428.0) (I50.32)  ? Colonic polyp (211.3) (K63.5)  ? Daytime sleepiness (780.54) (R40.0)  ? Elevated LFTs (790.6) (R79.89)  ? Essential hypertension (401.9) (I10)  ? Fatty infiltration of liver (571.8) (K76.0)  ? GERD (gastroesophageal reflux disease) (530.81) (K21.9)  ? Hyperlipidemia, unspecified hyperlipidemia type (272.4) (E78.5)  ? Screening for diabetes mellitus (V77.1) (Z13.1)  ? Screening for prostate cancer (V76.44) (Z12.5)  ? Suspected sleep apnea (781.99) (R29.818)  ? Thrombocytopenia (287.5) (D69.6)  ? History of Acute diastolic heart failure (428.31) (I50.31)  ? Resolved Date: 29 Nov 2017  ? History of Adult hypothyroidism (244.9) (E03.9)  ? Resolved Date: 28 Jan 2020  ? History of Back Surgery    Family  History:   There is no known family history of liver disease.  There is no known family history of colon cancer.    Social History:   He is .  He denied intravenous drug use.  He is a former smoker.  He was drinking about 6 beers 2 or 3 times a week.  He reportedly stopped drinking in late 11/2024. He denied blood transfusions.  He does have tattoos.    Review of systems: As noted above.  He has issues with intermittent edema and abdominal swelling.  He does have fatigue.  No fever, chills, weight loss, visual changes, auditory changes, shortness of breath, chest pain, abdominal pain, GI bleeding, diarrhea, constipation, depression, dysuria, hematuria, musculoskeletal issues, or rash.       Medical, Surgical, Family, and Social Histories  Past Medical History:   Diagnosis Date    Acute diastolic (congestive) heart failure 11/29/2017    Acute diastolic heart failure    Hypothyroidism, unspecified 05/16/2019    Adult hypothyroidism       Past Surgical History:   Procedure Laterality Date    BACK SURGERY  08/25/2017    Back Surgery       Family History   Family history unknown: Yes       Social History     Socioeconomic History    Marital status: Single     Spouse name: Not on file    Number of children: Not on file    Years of education: Not on file    Highest education level: Not on file   Occupational History    Not on file   Tobacco Use    Smoking status: Never     Passive exposure: Never    Smokeless tobacco: Never   Substance and Sexual Activity    Alcohol use: Not Currently    Drug use: Not on file    Sexual activity: Not on file   Other Topics Concern    Not on file   Social History Narrative    Not on file     Social Drivers of Health     Financial Resource Strain: Not on file   Food Insecurity: Not on file   Transportation Needs: Not on file   Physical Activity: Not on file   Stress: Not on file   Social Connections: Not on file   Intimate Partner Violence: Not on file   Housing Stability: Not on file        Allergies and Current Medications  No Known Allergies  Current Outpatient Medications   Medication Sig Dispense Refill    ascorbic acid, vitamin C, 500 mg capsule Take by mouth.      cholecalciferol, vitamin D3, (VITAMIN D3 ORAL) Take by mouth.      fish oil concentrate (Omega-3) 120-180 mg capsule Take by mouth.      FLAXSEED OIL ORAL Take by mouth.      furosemide (Lasix) 40 mg tablet Take 1 tablet (40 mg) by mouth once daily. 30 tablet 3    magnesium glycinate 100 mg magnesium capsule Take 1 capsule (100 mg) by mouth once daily at bedtime. 90 capsule 3    milk thistle seed extract 87.5 mg capsule Take by mouth.      omeprazole (PriLOSEC) 40 mg DR capsule Take 1 capsule (40 mg) by mouth once daily in the morning. Take before meals. Do not crush or chew. 90 capsule 3    POTASSIUM ORAL Take 2 tablets by mouth 1 (one) time each day.      valsartan-hydrochlorothiazide (Diovan-HCT) 320-25 mg tablet Take 1 tablet by mouth once daily. 21 tablet 0    melatonin 3 mg capsule Take 3 mg by mouth once daily at bedtime. (Patient not taking: Reported on 3/11/2025) 90 capsule 3    triamcinolone (Kenalog) 0.1 % cream Apply topically 2 times a day. Apply to affected area 1-2 times daily as needed. Avoid face and groin. (Patient not taking: Reported on 3/11/2025) 30 g 1     No current facility-administered medications for this visit.        Physical Exam  /58 (BP Location: Left arm, Patient Position: Sitting, BP Cuff Size: Large adult)   Pulse 74   Ht 1.829 m (6')   Wt 100 kg (221 lb)   SpO2 97%   BMI 29.97 kg/m²       Physical Examination:   General Appearance: alert and in no acute distress.   HEENT: oropharynx without lesions. Anicteric sclerae.   Neck supple, nontender, without adenopathy, thyromegaly, or JVD.   Lungs clear to auscultation and percussion.   Heart RRR without murmurs, rubs, or gallops.   Abdomen: Soft, nontender, bowel sounds positive, without obvious ascites. Liver felt about 4 cm below the  right costal margin and spleen palpable about 2 cm below the left costal margin.    Extremities full ROM, no atrophy, normal strength. No edema.   Skin no specific lesions.   Neurological exam nonfocal, alert and oriented.  No asterixis.  No spider angiomata, but he does have significant bilateral palmar erythema.     Labs 1/10/2025 lipase 200, glucose 104, sodium 135, creatinine 0.75, protein 7.3, albumin 2.9, alk phos 128, bilirubin 1.6, AST 60, ALT 22.    Labs 12/13/2024 alk phos 208, bilirubin 2.4, AST 84, ALT 23, WBC 11.8, hemoglobin 9.3, platelets 225.    Labs 11/8/2024 hemoglobin A1c 5.1%, cholesterol 146, , triglycerides 127.    Labs 7/31/2023 WBC 7.7, hemoglobin 14.6, platelets 122, HIV negative, hepatitis B surface antigen negative, hepatitis C antibody negative.    Labs 3/22/2023 cholesterol 266, , triglycerides 251.    Labs 7/7/2022 ceruloplasmin 24, hepatitis B surface antibody negative, INR 1.1, JEREMIAS negative, ferritin 334, iron saturation 80%, hepatitis B surface antigen negative, antimitochondrial antibody negative, platelets 127, hepatitis A antibody positive, hepatitis C antibody negative, alpha-1 antitrypsin phenotype PM, smooth muscle antibody negative.    Labs 3/14/2022 glucose 123, sodium 143, creatinine 0.81, protein 6.8, albumin 4, alk phos 96, bilirubin 1.2, AST 88, ALT 65, cholesterol 215, , triglycerides 145.    Labs 12/17/2021 , ALT 74, WBC 6.9, hemoglobin 14.5, platelets 130.    Labs 6/16/2021 platelets 138, hemoglobin A1c 5.7%.    Labs 7/30/2020 AST 64, ALT 55.    Labs 9/19/2019 , ALT 80.    Labs 3/29/2018 AST 76, ALT 75, cholesterol 250, triglycerides 295.    CAT scan with contrast 12/13/2024:  IMPRESSION:  1. Cirrhosis with portal hypertension. Mesenteric edema. Small amount  of ascites.  2. Wall thickening at the ascending colon, may be secondary to  underdistention, colitis or portal hypertensive colopathy.  3. Prominent gastric rugae, may be seen  with gastritis. Upper  endoscopy can help in better evaluation.  4. Mild peripancreatic edema, may be secondary to the mesenteric  edema and the presence of ascites. Please correlate with lipase level  to exclude superimposed acute pancreatitis.  5. Colonic diverticulosis.  6. Cholelithiasis.  7. Large hiatal hernia with a partially intrathoracic stomach.  8. Trace left pleural effusion with mild atelectasis at the left lung  base.  9. Mild diffuse body wall edema. More pronounced focal edema at the  soft tissues of the periumbilical region. Mild skin thickening at the  anterior lower abdominal wall. Please correlate with clinical exam to  exclude superimposed cellulitis.    Ultrasound 12/18/2024:  IMPRESSION:  Redemonstration of cirrhotic changes of the liver without focal liver  lesion identified.      Mild-to-moderate amount of ascites.      Cholelithiasis.    Ultrasound 7/18/2022:  IMPRESSION:  Coarsened and hyperechoic hepatic parenchyma, compatible with  steatosis or hepatocellular disease. Hepatomegaly. Unremarkable  sonographic appearance of the gallbladder.      Ultrasound 10/10/2019 revealed mild hepatomegaly with diffuse hepatic steatosis.    Colonoscopy 8/15/2022:  Impression:            - One 20 mm polyp in the cecum, removed piecemeal                          using a cold snare. Resected and retrieved. Injected.                          Tattooed.                         - One 8 mm polyp in the ascending colon, removed with                          a cold snare. Resected and retrieved.                         - One 10 mm polyp in the ascending colon, removed                          piecemeal using a cold snare. Resected and retrieved.                         - Diverticulosis in the sigmoid colon.        Assessment/Plan:     Fatty liver disease  Elevated liver function tests  Ascites  Cirrhosis    The patient is referred to me for follow-up evaluation of the above issues.    The patient has had known  elevated LFTs and fatty liver disease for many years.    He almost assuredly has a combination of alcoholic fatty liver disease and nonalcoholic fatty liver disease.  Unfortunately, he never followed up with me after July 2022 and never stopped drinking.  It appears as if he has now progressed to cirrhosis.  The imaging studies have been quite conclusive about this.  At this point, unfortunately, I do not feel that a biopsy or FibroScan would change that diagnosis.  We will treat him as if he has cirrhosis.    We again about fatty liver disease. I explained that the risk factors include diabetes, obesity, hyperlipidemia and alcohol use. I explained that he needs to work on diet, weight loss and exercise.  He also needs to work aggressively with his primary provider about hyperlipidemia.  I did explain that 20-25% of patients with PARISI may proceed to cirrhosis.  This, on top of his alcohol, is almost assuredly the case here.    I again reminded him that working on these same risk factors could decrease his future risk of heart disease and stroke.    I also recommend that he avoid all alcohol use.  I will check a PETH level today.    I also reminded him that with his possible pancreatitis that this is further reason to avoid all alcohol use.    I had previously done a thorough serologic evaluation which was essentially otherwise unrevealing.    He does need EGD and colonoscopy.  In fact, he is about 2 years overdue for colonoscopy for his large polyps.  The  [place] [facility] team did not feel that they could do his colonoscopy, so I will refer him to [facility] for these procedures.    He is immune to hepatitis A.    He is not immune to hepatitis B.  I did recommend that he check with his insurance company about arranging these vaccinations.    I will have him get repeat labs today along with a lipase and an AFP level.  We will make further recommendations once we have these results available.    He can follow-up with  [facility] for his GI needs.      Thank you for allowing me to participate in the care of this patient. Please feel free to contact me with any questions regarding their care.     Sincerely,     Satya Lopez MD, FAASLD, FACG.   Medical Director, Hepatology  Senior Attending Physician  [facility]  [facility]  Professor of Medicine  Division of Gastroenterology and Liver Disease  [address] [address]  Phone: [phone]  Fax: [phone].    Cc: SAAD Toscano      This document was generated with a computerized dictation system.  Because of this, there could be errors in grammar and/or content.

## 2025-03-12 LAB
AFP-TM SERPL-MCNC: 4 NG/ML
ALBUMIN SERPL-MCNC: 3.1 G/DL (ref 3.6–5.1)
ALBUMIN/GLOB SERPL: 0.8 (CALC) (ref 1–2.5)
ALP SERPL-CCNC: 117 U/L (ref 35–144)
ALT SERPL-CCNC: 20 U/L (ref 9–46)
ANION GAP SERPL CALCULATED.4IONS-SCNC: 11 MMOL/L (CALC) (ref 7–17)
AST SERPL-CCNC: 43 U/L (ref 10–35)
BASOPHILS # BLD AUTO: 63 CELLS/UL (ref 0–200)
BASOPHILS NFR BLD AUTO: 1 %
BILIRUB DIRECT SERPL-MCNC: 0.8 MG/DL
BILIRUB INDIRECT SERPL-MCNC: 1.5 MG/DL (CALC) (ref 0.2–1.2)
BILIRUB SERPL-MCNC: 2.3 MG/DL (ref 0.2–1.2)
BUN SERPL-MCNC: 16 MG/DL (ref 7–25)
BUN/CREAT SERPL: ABNORMAL (CALC) (ref 6–22)
CALCIUM SERPL-MCNC: 8.5 MG/DL (ref 8.6–10.3)
CHLORIDE SERPL-SCNC: 107 MMOL/L (ref 98–110)
CO2 SERPL-SCNC: 20 MMOL/L (ref 20–32)
CREAT SERPL-MCNC: 0.89 MG/DL (ref 0.7–1.35)
EGFRCR SERPLBLD CKD-EPI 2021: 95 ML/MIN/1.73M2
EOSINOPHIL # BLD AUTO: 353 CELLS/UL (ref 15–500)
EOSINOPHIL NFR BLD AUTO: 5.6 %
ERYTHROCYTE [DISTWIDTH] IN BLOOD BY AUTOMATED COUNT: 14.4 % (ref 11–15)
GLOBULIN SER CALC-MCNC: 3.7 G/DL (CALC) (ref 1.9–3.7)
GLUCOSE SERPL-MCNC: 98 MG/DL (ref 65–139)
HCT VFR BLD AUTO: 25 % (ref 38.5–50)
HGB BLD-MCNC: 7.8 G/DL (ref 13.2–17.1)
INR PPP: 1.4
LIPASE SERPL-CCNC: 39 U/L (ref 7–60)
LYMPHOCYTES # BLD AUTO: 2129 CELLS/UL (ref 850–3900)
LYMPHOCYTES NFR BLD AUTO: 33.8 %
MCH RBC QN AUTO: 26.8 PG (ref 27–33)
MCHC RBC AUTO-ENTMCNC: 31.2 G/DL (ref 32–36)
MCV RBC AUTO: 85.9 FL (ref 80–100)
MONOCYTES # BLD AUTO: 844 CELLS/UL (ref 200–950)
MONOCYTES NFR BLD AUTO: 13.4 %
NEUTROPHILS # BLD AUTO: 2911 CELLS/UL (ref 1500–7800)
NEUTROPHILS NFR BLD AUTO: 46.2 %
PLATELET # BLD AUTO: 148 THOUSAND/UL (ref 140–400)
PLPETH BLD-MCNC: NORMAL NG/ML
PMV BLD REES-ECKER: 9.4 FL (ref 7.5–12.5)
POPETH BLD-MCNC: NORMAL NG/ML
POTASSIUM SERPL-SCNC: 4.3 MMOL/L (ref 3.5–5.3)
PROT SERPL-MCNC: 6.8 G/DL (ref 6.1–8.1)
PROTHROMBIN TIME: 14.2 SEC (ref 9–11.5)
QUEST NOTES AND COMMENTS: NORMAL
QUEST PATIENT HISTORICAL REPORT: NORMAL
RBC # BLD AUTO: 2.91 MILLION/UL (ref 4.2–5.8)
SERVICE CMNT-IMP: NORMAL
SODIUM SERPL-SCNC: 138 MMOL/L (ref 135–146)
WBC # BLD AUTO: 6.3 THOUSAND/UL (ref 3.8–10.8)

## 2025-03-17 LAB
AFP-TM SERPL-MCNC: 4 NG/ML
ALBUMIN SERPL-MCNC: 3.1 G/DL (ref 3.6–5.1)
ALBUMIN/GLOB SERPL: 0.8 (CALC) (ref 1–2.5)
ALP SERPL-CCNC: 117 U/L (ref 35–144)
ALT SERPL-CCNC: 20 U/L (ref 9–46)
ANION GAP SERPL CALCULATED.4IONS-SCNC: 11 MMOL/L (CALC) (ref 7–17)
AST SERPL-CCNC: 43 U/L (ref 10–35)
BASOPHILS # BLD AUTO: 63 CELLS/UL (ref 0–200)
BASOPHILS NFR BLD AUTO: 1 %
BILIRUB DIRECT SERPL-MCNC: 0.8 MG/DL
BILIRUB INDIRECT SERPL-MCNC: 1.5 MG/DL (CALC) (ref 0.2–1.2)
BILIRUB SERPL-MCNC: 2.3 MG/DL (ref 0.2–1.2)
BUN SERPL-MCNC: 16 MG/DL (ref 7–25)
BUN/CREAT SERPL: ABNORMAL (CALC) (ref 6–22)
CALCIUM SERPL-MCNC: 8.5 MG/DL (ref 8.6–10.3)
CHLORIDE SERPL-SCNC: 107 MMOL/L (ref 98–110)
CO2 SERPL-SCNC: 20 MMOL/L (ref 20–32)
CREAT SERPL-MCNC: 0.89 MG/DL (ref 0.7–1.35)
EGFRCR SERPLBLD CKD-EPI 2021: 95 ML/MIN/1.73M2
EOSINOPHIL # BLD AUTO: 353 CELLS/UL (ref 15–500)
EOSINOPHIL NFR BLD AUTO: 5.6 %
ERYTHROCYTE [DISTWIDTH] IN BLOOD BY AUTOMATED COUNT: 14.4 % (ref 11–15)
GLOBULIN SER CALC-MCNC: 3.7 G/DL (CALC) (ref 1.9–3.7)
GLUCOSE SERPL-MCNC: 98 MG/DL (ref 65–139)
HCT VFR BLD AUTO: 25 % (ref 38.5–50)
HGB BLD-MCNC: 7.8 G/DL (ref 13.2–17.1)
INR PPP: 1.4
LIPASE SERPL-CCNC: 39 U/L (ref 7–60)
LYMPHOCYTES # BLD AUTO: 2129 CELLS/UL (ref 850–3900)
LYMPHOCYTES NFR BLD AUTO: 33.8 %
MCH RBC QN AUTO: 26.8 PG (ref 27–33)
MCHC RBC AUTO-ENTMCNC: 31.2 G/DL (ref 32–36)
MCV RBC AUTO: 85.9 FL (ref 80–100)
MONOCYTES # BLD AUTO: 844 CELLS/UL (ref 200–950)
MONOCYTES NFR BLD AUTO: 13.4 %
NEUTROPHILS # BLD AUTO: 2911 CELLS/UL (ref 1500–7800)
NEUTROPHILS NFR BLD AUTO: 46.2 %
PLATELET # BLD AUTO: 148 THOUSAND/UL (ref 140–400)
PLPETH BLD-MCNC: NEGATIVE NG/ML
PMV BLD REES-ECKER: 9.4 FL (ref 7.5–12.5)
POPETH BLD-MCNC: NEGATIVE NG/ML
POTASSIUM SERPL-SCNC: 4.3 MMOL/L (ref 3.5–5.3)
PROT SERPL-MCNC: 6.8 G/DL (ref 6.1–8.1)
PROTHROMBIN TIME: 14.2 SEC (ref 9–11.5)
QUEST NOTES AND COMMENTS: NORMAL
RBC # BLD AUTO: 2.91 MILLION/UL (ref 4.2–5.8)
SERVICE CMNT-IMP: NORMAL
SODIUM SERPL-SCNC: 138 MMOL/L (ref 135–146)
WBC # BLD AUTO: 6.3 THOUSAND/UL (ref 3.8–10.8)

## 2025-04-03 ENCOUNTER — ANESTHESIA (OUTPATIENT)
Dept: GASTROENTEROLOGY | Facility: HOSPITAL | Age: 65
End: 2025-04-03
Payer: COMMERCIAL

## 2025-04-03 ENCOUNTER — HOSPITAL ENCOUNTER (OUTPATIENT)
Dept: GASTROENTEROLOGY | Facility: HOSPITAL | Age: 65
Discharge: HOME | End: 2025-04-03
Payer: COMMERCIAL

## 2025-04-03 ENCOUNTER — ANESTHESIA EVENT (OUTPATIENT)
Dept: GASTROENTEROLOGY | Facility: HOSPITAL | Age: 65
End: 2025-04-03
Payer: COMMERCIAL

## 2025-04-03 VITALS
DIASTOLIC BLOOD PRESSURE: 70 MMHG | HEIGHT: 70 IN | HEART RATE: 68 BPM | RESPIRATION RATE: 18 BRPM | SYSTOLIC BLOOD PRESSURE: 128 MMHG | WEIGHT: 205.03 LBS | OXYGEN SATURATION: 100 % | BODY MASS INDEX: 29.35 KG/M2 | TEMPERATURE: 96.8 F

## 2025-04-03 DIAGNOSIS — I85.10 ESOPHAGEAL VARICES IN CIRRHOSIS (MULTI): ICD-10-CM

## 2025-04-03 DIAGNOSIS — K76.6 PORTAL HYPERTENSION (MULTI): ICD-10-CM

## 2025-04-03 DIAGNOSIS — K74.60 ESOPHAGEAL VARICES IN CIRRHOSIS (MULTI): ICD-10-CM

## 2025-04-03 DIAGNOSIS — Z12.11 SCREEN FOR COLON CANCER: ICD-10-CM

## 2025-04-03 DIAGNOSIS — K74.60 CIRRHOSIS OF LIVER WITH ASCITES, UNSPECIFIED HEPATIC CIRRHOSIS TYPE (MULTI): ICD-10-CM

## 2025-04-03 DIAGNOSIS — R18.8 CIRRHOSIS OF LIVER WITH ASCITES, UNSPECIFIED HEPATIC CIRRHOSIS TYPE (MULTI): ICD-10-CM

## 2025-04-03 LAB
ABO GROUP (TYPE) IN BLOOD: NORMAL
ANTIBODY SCREEN: NORMAL
HCT VFR BLD AUTO: 23.8 % (ref 41–52)
HGB BLD-MCNC: 7.5 G/DL (ref 13.5–17.5)
RH FACTOR (ANTIGEN D): NORMAL

## 2025-04-03 PROCEDURE — 43239 EGD BIOPSY SINGLE/MULTIPLE: CPT | Performed by: INTERNAL MEDICINE

## 2025-04-03 PROCEDURE — 86900 BLOOD TYPING SEROLOGIC ABO: CPT | Performed by: ANESTHESIOLOGY

## 2025-04-03 PROCEDURE — 3700000001 HC GENERAL ANESTHESIA TIME - INITIAL BASE CHARGE

## 2025-04-03 PROCEDURE — A45378 PR COLONOSCOPY,DIAGNOSTIC: Performed by: ANESTHESIOLOGY

## 2025-04-03 PROCEDURE — G0121 COLON CA SCRN NOT HI RSK IND: HCPCS | Performed by: INTERNAL MEDICINE

## 2025-04-03 PROCEDURE — 7100000010 HC PHASE TWO TIME - EACH INCREMENTAL 1 MINUTE

## 2025-04-03 PROCEDURE — 7100000009 HC PHASE TWO TIME - INITIAL BASE CHARGE

## 2025-04-03 PROCEDURE — 3700000002 HC GENERAL ANESTHESIA TIME - EACH INCREMENTAL 1 MINUTE

## 2025-04-03 PROCEDURE — 36415 COLL VENOUS BLD VENIPUNCTURE: CPT | Performed by: ANESTHESIOLOGY

## 2025-04-03 PROCEDURE — 85018 HEMOGLOBIN: CPT | Performed by: ANESTHESIOLOGY

## 2025-04-03 PROCEDURE — 2500000004 HC RX 250 GENERAL PHARMACY W/ HCPCS (ALT 636 FOR OP/ED): Performed by: ANESTHESIOLOGIST ASSISTANT

## 2025-04-03 PROCEDURE — 45378 DIAGNOSTIC COLONOSCOPY: CPT | Performed by: INTERNAL MEDICINE

## 2025-04-03 PROCEDURE — A45378 PR COLONOSCOPY,DIAGNOSTIC: Performed by: ANESTHESIOLOGIST ASSISTANT

## 2025-04-03 RX ORDER — PROPOFOL 10 MG/ML
INJECTION, EMULSION INTRAVENOUS AS NEEDED
Status: DISCONTINUED | OUTPATIENT
Start: 2025-04-03 | End: 2025-04-03

## 2025-04-03 RX ORDER — LIDOCAINE HYDROCHLORIDE 20 MG/ML
INJECTION, SOLUTION EPIDURAL; INFILTRATION; INTRACAUDAL; PERINEURAL AS NEEDED
Status: DISCONTINUED | OUTPATIENT
Start: 2025-04-03 | End: 2025-04-03

## 2025-04-03 RX ORDER — PHENYLEPHRINE HCL IN 0.9% NACL 1 MG/10 ML
SYRINGE (ML) INTRAVENOUS AS NEEDED
Status: DISCONTINUED | OUTPATIENT
Start: 2025-04-03 | End: 2025-04-03

## 2025-04-03 RX ADMIN — PROPOFOL 175 MCG/KG/MIN: 10 INJECTION, EMULSION INTRAVENOUS at 08:41

## 2025-04-03 RX ADMIN — SODIUM CHLORIDE: 9 INJECTION, SOLUTION INTRAVENOUS at 08:32

## 2025-04-03 RX ADMIN — PROPOFOL 23 MG: 10 INJECTION, EMULSION INTRAVENOUS at 08:42

## 2025-04-03 RX ADMIN — Medication 100 MCG: at 08:58

## 2025-04-03 RX ADMIN — Medication 100 MCG: at 09:12

## 2025-04-03 RX ADMIN — LIDOCAINE HYDROCHLORIDE 50 MG: 20 INJECTION, SOLUTION EPIDURAL; INFILTRATION; INTRACAUDAL; PERINEURAL at 08:42

## 2025-04-03 RX ADMIN — LIDOCAINE HYDROCHLORIDE 50 MG: 20 INJECTION, SOLUTION EPIDURAL; INFILTRATION; INTRACAUDAL; PERINEURAL at 08:40

## 2025-04-03 RX ADMIN — Medication 100 MCG: at 09:00

## 2025-04-03 RX ADMIN — Medication 100 MCG: at 08:56

## 2025-04-03 RX ADMIN — Medication 100 MCG: at 08:54

## 2025-04-03 RX ADMIN — PROPOFOL 60 MG: 10 INJECTION, EMULSION INTRAVENOUS at 08:40

## 2025-04-03 SDOH — HEALTH STABILITY: MENTAL HEALTH: CURRENT SMOKER: 0

## 2025-04-03 ASSESSMENT — PAIN - FUNCTIONAL ASSESSMENT
PAIN_FUNCTIONAL_ASSESSMENT: 0-10

## 2025-04-03 ASSESSMENT — PAIN SCALES - GENERAL
PAINLEVEL_OUTOF10: 0 - NO PAIN

## 2025-04-03 NOTE — ANESTHESIA POSTPROCEDURE EVALUATION
Patient: Dalton Valverde    Procedure Summary       Date: 04/03/25 Room / Location: [facility]    Anesthesia Start: 0832 Anesthesia Stop: 0922    Procedures:       COLONOSCOPY      EGD Diagnosis:       Screen for colon cancer      Cirrhosis of liver with ascites, unspecified hepatic cirrhosis type (Multi)      Portal hypertension (Multi)      Esophageal varices in cirrhosis (Multi)    Scheduled Providers: Heriberto Tapia MD; Jorden Peraza MD; ROSALIE Christine Responsible Provider: Jorden Peraza MD    Anesthesia Type: general ASA Status: 4            Anesthesia Type: general    Vitals Value Taken Time   /70 04/03/25 0955   Temp 36 °C (96.8 °F) 04/03/25 0925   Pulse 68 04/03/25 0955   Resp 18 04/03/25 0955   SpO2 98 04/03/25 1120       Anesthesia Post Evaluation    Patient location during evaluation: bedside  Patient participation: complete - patient participated  Level of consciousness: awake and alert  Pain management: adequate  Airway patency: patent  Cardiovascular status: acceptable  Respiratory status: acceptable  Hydration status: acceptable  Postoperative Nausea and Vomiting: none      No notable events documented.

## 2025-04-03 NOTE — H&P
"History Of Present Illness  Dalton Valverde is a 64 y.o. male presenting with cirrhosis, hx colon polyps.     Past Medical History  Past Medical History:   Diagnosis Date    Acute diastolic (congestive) heart failure 11/29/2017    Acute diastolic heart failure    Alcoholic cirrhosis of liver with ascites (Multi)     Colon polyp     GERD (gastroesophageal reflux disease)     Hyperlipidemia     Hypertension     Hypothyroidism, unspecified 05/16/2019    Adult hypothyroidism    Portal hypertension (Multi)     Suspected sleep apnea      Surgical History  Past Surgical History:   Procedure Laterality Date    BACK SURGERY  08/25/2017    Back Surgery     Social History  He reports that he has never smoked. He has never been exposed to tobacco smoke. He has never used smokeless tobacco. He reports that he does not currently use alcohol. He reports that he does not use drugs.    Family History  Family History   Family history unknown: Yes        Allergies  No Known Allergies  Review of Systems     Physical Exam  Vitals reviewed.   Constitutional:       Appearance: Normal appearance.   Neurological:      Mental Status: He is alert.          Last Recorded Vitals  Blood pressure 113/50, pulse 74, temperature 36.5 °C (97.7 °F), temperature source Temporal, resp. rate 18, height 1.778 m (5' 10\"), weight 93 kg (205 lb 0.4 oz), SpO2 100%.    Assessment/Plan   R/O varices, polyps for EGD/COL     Heriberto Tapia MD  "

## 2025-04-03 NOTE — ANESTHESIA PREPROCEDURE EVALUATION
Patient: Dalton Valverde    Procedure Information       Date/Time: 04/03/25 0830    Scheduled providers: Heriberto Tapia MD; Jorden Peraza MD; ROSALIE Christnie    Procedures:       COLONOSCOPY      EGD    Location: [facility]            Relevant Problems   Anesthesia (within normal limits)      Cardiac   (+) Chronic diastolic congestive heart failure   (+) Essential hypertension   (+) Hyperlipidemia      Pulmonary (within normal limits)      Neuro (within normal limits)      GI   (+) GERD (gastroesophageal reflux disease)      /Renal (within normal limits)      Liver   (+) Alcoholic cirrhosis of liver with ascites (Multi)   (+) Elevated LFTs   (+) Fatty infiltration of liver      Endocrine (within normal limits)      Hematology (within normal limits)      Musculoskeletal (within normal limits)      HEENT   (+) Acute maxillary sinusitis      ID (within normal limits)      Skin (within normal limits)      GYN (within normal limits)       Clinical information reviewed:   Tobacco   Meds   Med Hx  Surg Hx   Fam Hx  Soc Hx        NPO Detail:  NPO/Void Status  Carbohydrate Drink Given Prior to Surgery? : N  Date of Last Liquid: 04/03/25  Time of Last Liquid: 0530  Date of Last Solid: 04/01/25  Time of Last Solid: 2100  Last Intake Type: Clear fluids  Time of Last Void: 0500         Physical Exam    Airway  Mallampati: I  TM distance: >3 FB  Neck ROM: full     Cardiovascular - normal exam     Dental - normal exam     Pulmonary - normal exam     Abdominal - normal exam         Anesthesia Plan    History of general anesthesia?: yes  History of complications of general anesthesia?: no    ASA 4     general   (Patient is anemic. Getting an h&h and t&s.)  The patient is not a current smoker.    intravenous induction   Anesthetic plan and risks discussed with patient.  Use of blood products discussed with patient who consented to blood products.

## 2025-04-04 NOTE — ADDENDUM NOTE
Encounter addended by: Jessica Morales RN on: 4/4/2025 10:21 AM   Actions taken: Contacts section saved, Flowsheet accepted

## 2025-04-11 LAB
LABORATORY COMMENT REPORT: NORMAL
PATH REPORT.FINAL DX SPEC: NORMAL
PATH REPORT.GROSS SPEC: NORMAL
PATH REPORT.RELEVANT HX SPEC: NORMAL
PATH REPORT.TOTAL CANCER: NORMAL

## 2025-04-16 PROBLEM — K31.89 PORTAL HYPERTENSIVE GASTROPATHY (MULTI): Status: ACTIVE | Noted: 2025-01-13

## 2025-09-04 ENCOUNTER — APPOINTMENT (OUTPATIENT)
Dept: PRIMARY CARE | Facility: CLINIC | Age: 65
End: 2025-09-04
Payer: COMMERCIAL

## 2025-09-04 ASSESSMENT — ENCOUNTER SYMPTOMS
ABDOMINAL PAIN: 0
DECREASED CONCENTRATION: 0
SHORTNESS OF BREATH: 0
DEPRESSION: 0
LOSS OF SENSATION IN FEET: 0
CONFUSION: 0
ABDOMINAL DISTENTION: 0
FATIGUE: 0
FEVER: 0
OCCASIONAL FEELINGS OF UNSTEADINESS: 0

## 2025-09-04 ASSESSMENT — ACTIVITIES OF DAILY LIVING (ADL)
MANAGING_FINANCES: INDEPENDENT
DOING_HOUSEWORK: INDEPENDENT
TAKING_MEDICATION: INDEPENDENT
GROCERY_SHOPPING: INDEPENDENT
DRESSING: INDEPENDENT
BATHING: INDEPENDENT

## 2025-09-04 ASSESSMENT — ANXIETY QUESTIONNAIRES
3. WORRYING TOO MUCH ABOUT DIFFERENT THINGS: NOT AT ALL
IF YOU CHECKED OFF ANY PROBLEMS ON THIS QUESTIONNAIRE, HOW DIFFICULT HAVE THESE PROBLEMS MADE IT FOR YOU TO DO YOUR WORK, TAKE CARE OF THINGS AT HOME, OR GET ALONG WITH OTHER PEOPLE: NOT DIFFICULT AT ALL
GAD7 TOTAL SCORE: 0
5. BEING SO RESTLESS THAT IT IS HARD TO SIT STILL: NOT AT ALL
6. BECOMING EASILY ANNOYED OR IRRITABLE: NOT AT ALL
1. FEELING NERVOUS, ANXIOUS, OR ON EDGE: NOT AT ALL
4. TROUBLE RELAXING: NOT AT ALL
7. FEELING AFRAID AS IF SOMETHING AWFUL MIGHT HAPPEN: NOT AT ALL
2. NOT BEING ABLE TO STOP OR CONTROL WORRYING: NOT AT ALL

## 2025-10-06 ENCOUNTER — APPOINTMENT (OUTPATIENT)
Dept: PRIMARY CARE | Facility: CLINIC | Age: 65
End: 2025-10-06
Payer: COMMERCIAL

## 2026-03-04 ENCOUNTER — APPOINTMENT (OUTPATIENT)
Dept: PRIMARY CARE | Facility: CLINIC | Age: 66
End: 2026-03-04
Payer: COMMERCIAL